# Patient Record
Sex: MALE | Employment: FULL TIME | URBAN - METROPOLITAN AREA
[De-identification: names, ages, dates, MRNs, and addresses within clinical notes are randomized per-mention and may not be internally consistent; named-entity substitution may affect disease eponyms.]

---

## 2021-06-18 ENCOUNTER — TELEPHONE (OUTPATIENT)
Dept: SURGERY | Facility: CLINIC | Age: 51
End: 2021-06-18

## 2021-06-21 ENCOUNTER — OFFICE VISIT (OUTPATIENT)
Dept: SURGERY | Facility: CLINIC | Age: 51
End: 2021-06-21
Payer: COMMERCIAL

## 2021-06-21 VITALS
HEART RATE: 68 BPM | DIASTOLIC BLOOD PRESSURE: 63 MMHG | SYSTOLIC BLOOD PRESSURE: 105 MMHG | WEIGHT: 217 LBS | HEIGHT: 65 IN | BODY MASS INDEX: 36.15 KG/M2

## 2021-06-21 DIAGNOSIS — K64.8 HEMORRHOIDS, INTERNAL, WITH BLEEDING: Primary | ICD-10-CM

## 2021-06-21 PROCEDURE — 99203 OFFICE O/P NEW LOW 30 MIN: CPT | Mod: 25,S$GLB,, | Performed by: COLON & RECTAL SURGERY

## 2021-06-21 PROCEDURE — 3008F PR BODY MASS INDEX (BMI) DOCUMENTED: ICD-10-PCS | Mod: CPTII,S$GLB,, | Performed by: COLON & RECTAL SURGERY

## 2021-06-21 PROCEDURE — 99999 PR PBB SHADOW E&M-EST. PATIENT-LVL III: CPT | Mod: PBBFAC,,, | Performed by: COLON & RECTAL SURGERY

## 2021-06-21 PROCEDURE — 1126F PR PAIN SEVERITY QUANTIFIED, NO PAIN PRESENT: ICD-10-PCS | Mod: CPTII,S$GLB,, | Performed by: COLON & RECTAL SURGERY

## 2021-06-21 PROCEDURE — 99999 PR PBB SHADOW E&M-EST. PATIENT-LVL III: ICD-10-PCS | Mod: PBBFAC,,, | Performed by: COLON & RECTAL SURGERY

## 2021-06-21 PROCEDURE — 46221 PR HEMORRHOIDECTOMY INTERNAL RUBBER BAND LIGATIONS: ICD-10-PCS | Mod: S$GLB,,, | Performed by: COLON & RECTAL SURGERY

## 2021-06-21 PROCEDURE — 46221 LIGATION OF HEMORRHOID(S): CPT | Mod: S$GLB,,, | Performed by: COLON & RECTAL SURGERY

## 2021-06-21 PROCEDURE — 1126F AMNT PAIN NOTED NONE PRSNT: CPT | Mod: CPTII,S$GLB,, | Performed by: COLON & RECTAL SURGERY

## 2021-06-21 PROCEDURE — 3008F BODY MASS INDEX DOCD: CPT | Mod: CPTII,S$GLB,, | Performed by: COLON & RECTAL SURGERY

## 2021-06-21 PROCEDURE — 99203 PR OFFICE/OUTPT VISIT, NEW, LEVL III, 30-44 MIN: ICD-10-PCS | Mod: 25,S$GLB,, | Performed by: COLON & RECTAL SURGERY

## 2021-06-21 RX ORDER — LISINOPRIL 10 MG/1
10 TABLET ORAL EVERY MORNING
COMMUNITY

## 2021-06-21 RX ORDER — FLUOXETINE 20 MG/1
20 TABLET ORAL EVERY MORNING
COMMUNITY

## 2021-06-21 RX ORDER — LOVASTATIN 40 MG/1
40 TABLET ORAL NIGHTLY
COMMUNITY

## 2021-06-21 RX ORDER — INDOMETHACIN 50 MG/1
50 CAPSULE ORAL 3 TIMES DAILY
COMMUNITY
End: 2021-12-02

## 2021-06-26 ENCOUNTER — NURSE TRIAGE (OUTPATIENT)
Dept: ADMINISTRATIVE | Facility: CLINIC | Age: 51
End: 2021-06-26

## 2021-06-29 ENCOUNTER — TELEPHONE (OUTPATIENT)
Dept: SURGERY | Facility: CLINIC | Age: 51
End: 2021-06-29

## 2021-07-22 ENCOUNTER — OFFICE VISIT (OUTPATIENT)
Dept: SURGERY | Facility: CLINIC | Age: 51
End: 2021-07-22
Payer: COMMERCIAL

## 2021-07-22 VITALS
DIASTOLIC BLOOD PRESSURE: 86 MMHG | WEIGHT: 215.19 LBS | HEART RATE: 85 BPM | SYSTOLIC BLOOD PRESSURE: 124 MMHG | BODY MASS INDEX: 35.85 KG/M2 | HEIGHT: 65 IN | OXYGEN SATURATION: 99 %

## 2021-07-22 DIAGNOSIS — K64.8 INTERNAL HEMORRHOIDS: Primary | ICD-10-CM

## 2021-07-22 PROCEDURE — 99999 PR PBB SHADOW E&M-EST. PATIENT-LVL III: ICD-10-PCS | Mod: PBBFAC,,, | Performed by: COLON & RECTAL SURGERY

## 2021-07-22 PROCEDURE — 99999 PR PBB SHADOW E&M-EST. PATIENT-LVL III: CPT | Mod: PBBFAC,,, | Performed by: COLON & RECTAL SURGERY

## 2021-07-22 PROCEDURE — 3008F BODY MASS INDEX DOCD: CPT | Mod: CPTII,S$GLB,, | Performed by: COLON & RECTAL SURGERY

## 2021-07-22 PROCEDURE — 99213 OFFICE O/P EST LOW 20 MIN: CPT | Mod: 25,S$GLB,, | Performed by: COLON & RECTAL SURGERY

## 2021-07-22 PROCEDURE — 46600 PR DIAG2STIC A2SCOPY: ICD-10-PCS | Mod: S$GLB,,, | Performed by: COLON & RECTAL SURGERY

## 2021-07-22 PROCEDURE — 3008F PR BODY MASS INDEX (BMI) DOCUMENTED: ICD-10-PCS | Mod: CPTII,S$GLB,, | Performed by: COLON & RECTAL SURGERY

## 2021-07-22 PROCEDURE — 99213 PR OFFICE/OUTPT VISIT, EST, LEVL III, 20-29 MIN: ICD-10-PCS | Mod: 25,S$GLB,, | Performed by: COLON & RECTAL SURGERY

## 2021-07-22 PROCEDURE — 1126F AMNT PAIN NOTED NONE PRSNT: CPT | Mod: CPTII,S$GLB,, | Performed by: COLON & RECTAL SURGERY

## 2021-07-22 PROCEDURE — 1126F PR PAIN SEVERITY QUANTIFIED, NO PAIN PRESENT: ICD-10-PCS | Mod: CPTII,S$GLB,, | Performed by: COLON & RECTAL SURGERY

## 2021-07-22 PROCEDURE — 46600 DIAGNOSTIC ANOSCOPY SPX: CPT | Mod: S$GLB,,, | Performed by: COLON & RECTAL SURGERY

## 2021-07-22 RX ORDER — PANTOPRAZOLE SODIUM 40 MG/1
40 TABLET, DELAYED RELEASE ORAL DAILY
COMMUNITY
Start: 2021-05-26 | End: 2021-07-22

## 2021-07-22 RX ORDER — MULTIVIT WITH MINERALS/HERBS
1 TABLET ORAL DAILY
COMMUNITY
End: 2021-12-02

## 2021-07-22 RX ORDER — ACETAMINOPHEN 325 MG/1
325 TABLET ORAL DAILY PRN
COMMUNITY
End: 2021-12-02

## 2021-10-11 ENCOUNTER — TELEPHONE (OUTPATIENT)
Dept: SURGERY | Facility: CLINIC | Age: 51
End: 2021-10-11

## 2021-10-14 ENCOUNTER — OFFICE VISIT (OUTPATIENT)
Dept: SURGERY | Facility: CLINIC | Age: 51
End: 2021-10-14
Payer: COMMERCIAL

## 2021-10-14 VITALS
DIASTOLIC BLOOD PRESSURE: 79 MMHG | HEIGHT: 65 IN | BODY MASS INDEX: 34.43 KG/M2 | SYSTOLIC BLOOD PRESSURE: 134 MMHG | WEIGHT: 206.63 LBS | HEART RATE: 75 BPM

## 2021-10-14 DIAGNOSIS — K64.8 PROLAPSED INTERNAL HEMORRHOIDS: Primary | ICD-10-CM

## 2021-10-14 PROCEDURE — 99999 PR PBB SHADOW E&M-EST. PATIENT-LVL III: CPT | Mod: PBBFAC,,, | Performed by: COLON & RECTAL SURGERY

## 2021-10-14 PROCEDURE — 99213 OFFICE O/P EST LOW 20 MIN: CPT | Mod: S$GLB,,, | Performed by: COLON & RECTAL SURGERY

## 2021-10-14 PROCEDURE — 99999 PR PBB SHADOW E&M-EST. PATIENT-LVL III: ICD-10-PCS | Mod: PBBFAC,,, | Performed by: COLON & RECTAL SURGERY

## 2021-10-14 PROCEDURE — 99213 PR OFFICE/OUTPT VISIT, EST, LEVL III, 20-29 MIN: ICD-10-PCS | Mod: S$GLB,,, | Performed by: COLON & RECTAL SURGERY

## 2021-10-19 ENCOUNTER — HOSPITAL ENCOUNTER (OUTPATIENT)
Dept: PULMONOLOGY | Facility: HOSPITAL | Age: 51
Discharge: HOME | End: 2021-10-19
Attending: COLON & RECTAL SURGERY
Payer: COMMERCIAL

## 2021-10-19 ENCOUNTER — HOSPITAL ENCOUNTER (OUTPATIENT)
Dept: PREADMISSION TESTING | Facility: HOSPITAL | Age: 51
Discharge: HOME | End: 2021-10-19
Attending: COLON & RECTAL SURGERY
Payer: COMMERCIAL

## 2021-10-19 DIAGNOSIS — Z01.818 PRE-OP TESTING: ICD-10-CM

## 2021-10-19 DIAGNOSIS — Z01.818 PREOP TESTING: ICD-10-CM

## 2021-10-19 LAB
SARS-COV-2 RNA RESP QL NAA+PROBE: NOT DETECTED
SARS-COV-2- CYCLE NUMBER: NORMAL

## 2021-10-19 PROCEDURE — U0005 INFEC AGEN DETEC AMPLI PROBE: HCPCS | Performed by: COLON & RECTAL SURGERY

## 2021-10-19 PROCEDURE — 93005 ELECTROCARDIOGRAM TRACING: CPT

## 2021-10-19 PROCEDURE — U0003 INFECTIOUS AGENT DETECTION BY NUCLEIC ACID (DNA OR RNA); SEVERE ACUTE RESPIRATORY SYNDROME CORONAVIRUS 2 (SARS-COV-2) (CORONAVIRUS DISEASE [COVID-19]), AMPLIFIED PROBE TECHNIQUE, MAKING USE OF HIGH THROUGHPUT TECHNOLOGIES AS DESCRIBED BY CMS-2020-01-R: HCPCS | Performed by: COLON & RECTAL SURGERY

## 2021-10-19 PROCEDURE — 93010 ELECTROCARDIOGRAM REPORT: CPT | Mod: ,,, | Performed by: INTERNAL MEDICINE

## 2021-10-19 PROCEDURE — 93010 EKG 12-LEAD: ICD-10-PCS | Mod: ,,, | Performed by: INTERNAL MEDICINE

## 2021-10-20 ENCOUNTER — ANESTHESIA EVENT (OUTPATIENT)
Dept: ENDOSCOPY | Facility: HOSPITAL | Age: 51
End: 2021-10-20
Payer: COMMERCIAL

## 2021-10-21 ENCOUNTER — HOSPITAL ENCOUNTER (OUTPATIENT)
Facility: HOSPITAL | Age: 51
Discharge: HOME | End: 2021-10-21
Attending: COLON & RECTAL SURGERY | Admitting: COLON & RECTAL SURGERY
Payer: COMMERCIAL

## 2021-10-21 ENCOUNTER — ANESTHESIA (OUTPATIENT)
Dept: ENDOSCOPY | Facility: HOSPITAL | Age: 51
End: 2021-10-21
Payer: COMMERCIAL

## 2021-10-21 VITALS
OXYGEN SATURATION: 97 % | RESPIRATION RATE: 18 BRPM | DIASTOLIC BLOOD PRESSURE: 60 MMHG | HEART RATE: 75 BPM | SYSTOLIC BLOOD PRESSURE: 104 MMHG

## 2021-10-21 DIAGNOSIS — Z12.11 COLON CANCER SCREENING: ICD-10-CM

## 2021-10-21 PROCEDURE — 37000009 HC ANESTHESIA EA ADD 15 MINS: Performed by: COLON & RECTAL SURGERY

## 2021-10-21 PROCEDURE — 00812 ANES LWR INTST SCR COLSC: CPT | Mod: QZ,P2 | Performed by: NURSE ANESTHETIST, CERTIFIED REGISTERED

## 2021-10-21 PROCEDURE — 63600175 PHARM REV CODE 636 W HCPCS: Performed by: COLON & RECTAL SURGERY

## 2021-10-21 PROCEDURE — 45378 DIAGNOSTIC COLONOSCOPY: CPT | Performed by: COLON & RECTAL SURGERY

## 2021-10-21 PROCEDURE — 37000008 HC ANESTHESIA 1ST 15 MINUTES: Performed by: COLON & RECTAL SURGERY

## 2021-10-21 RX ORDER — PROPOFOL 10 MG/ML
INJECTION, EMULSION INTRAVENOUS
Status: DISCONTINUED
Start: 2021-10-21 | End: 2021-10-21 | Stop reason: HOSPADM

## 2021-10-21 RX ORDER — SODIUM CHLORIDE, SODIUM LACTATE, POTASSIUM CHLORIDE, CALCIUM CHLORIDE 600; 310; 30; 20 MG/100ML; MG/100ML; MG/100ML; MG/100ML
INJECTION, SOLUTION INTRAVENOUS CONTINUOUS
Status: DISCONTINUED | OUTPATIENT
Start: 2021-10-21 | End: 2021-10-21 | Stop reason: HOSPADM

## 2021-10-21 RX ADMIN — SODIUM CHLORIDE, SODIUM LACTATE, POTASSIUM CHLORIDE, AND CALCIUM CHLORIDE: .6; .31; .03; .02 INJECTION, SOLUTION INTRAVENOUS at 10:10

## 2021-10-22 ENCOUNTER — TELEPHONE (OUTPATIENT)
Dept: SURGERY | Facility: CLINIC | Age: 51
End: 2021-10-22

## 2021-10-25 ENCOUNTER — TELEPHONE (OUTPATIENT)
Dept: SURGERY | Facility: CLINIC | Age: 51
End: 2021-10-25
Payer: COMMERCIAL

## 2021-10-27 ENCOUNTER — HOSPITAL ENCOUNTER (OUTPATIENT)
Dept: RADIOLOGY | Facility: HOSPITAL | Age: 51
Discharge: HOME | End: 2021-10-27
Attending: COLON & RECTAL SURGERY
Payer: COMMERCIAL

## 2021-10-27 DIAGNOSIS — K62.3 RECTAL PROLAPSE: ICD-10-CM

## 2021-10-27 PROCEDURE — 25500020 PHARM REV CODE 255: Performed by: COLON & RECTAL SURGERY

## 2021-10-27 PROCEDURE — A9698 NON-RAD CONTRAST MATERIALNOC: HCPCS | Performed by: COLON & RECTAL SURGERY

## 2021-10-27 PROCEDURE — 74270 X-RAY XM COLON 1CNTRST STD: CPT | Mod: TC

## 2021-10-27 PROCEDURE — 74270 X-RAY XM COLON 1CNTRST STD: CPT | Mod: 26,,, | Performed by: RADIOLOGY

## 2021-10-27 PROCEDURE — 74270 FL DEFECOGRAM: ICD-10-PCS | Mod: 26,,, | Performed by: RADIOLOGY

## 2021-10-27 RX ADMIN — BARIUM SULFATE 454 G: 0.6 CREAM ORAL at 10:10

## 2021-10-27 RX ADMIN — BARIUM SULFATE 60 ML: 0.6 SUSPENSION ORAL at 10:10

## 2021-10-29 ENCOUNTER — TELEPHONE (OUTPATIENT)
Dept: SURGERY | Facility: CLINIC | Age: 51
End: 2021-10-29
Payer: COMMERCIAL

## 2021-11-02 ENCOUNTER — TELEPHONE (OUTPATIENT)
Dept: SURGERY | Facility: CLINIC | Age: 51
End: 2021-11-02
Payer: COMMERCIAL

## 2021-11-05 ENCOUNTER — PATIENT MESSAGE (OUTPATIENT)
Dept: SURGERY | Facility: CLINIC | Age: 51
End: 2021-11-05
Payer: COMMERCIAL

## 2021-11-08 ENCOUNTER — PATIENT MESSAGE (OUTPATIENT)
Dept: SURGERY | Facility: CLINIC | Age: 51
End: 2021-11-08
Payer: COMMERCIAL

## 2021-11-08 DIAGNOSIS — K64.8 PROLAPSED INTERNAL HEMORRHOIDS: Primary | ICD-10-CM

## 2021-11-08 DIAGNOSIS — K64.4 EXTERNAL HEMORRHOIDS: ICD-10-CM

## 2021-11-09 ENCOUNTER — TELEPHONE (OUTPATIENT)
Dept: SURGERY | Facility: CLINIC | Age: 51
End: 2021-11-09
Payer: COMMERCIAL

## 2021-12-02 ENCOUNTER — OFFICE VISIT (OUTPATIENT)
Dept: SURGERY | Facility: CLINIC | Age: 51
End: 2021-12-02
Payer: COMMERCIAL

## 2021-12-02 ENCOUNTER — HOSPITAL ENCOUNTER (OUTPATIENT)
Dept: PULMONOLOGY | Facility: HOSPITAL | Age: 51
Discharge: HOME | End: 2021-12-02
Attending: COLON & RECTAL SURGERY
Payer: COMMERCIAL

## 2021-12-02 VITALS
BODY MASS INDEX: 35.85 KG/M2 | DIASTOLIC BLOOD PRESSURE: 74 MMHG | WEIGHT: 215.19 LBS | SYSTOLIC BLOOD PRESSURE: 130 MMHG | HEIGHT: 65 IN | HEART RATE: 90 BPM | RESPIRATION RATE: 16 BRPM

## 2021-12-02 DIAGNOSIS — K64.8 PROLAPSED INTERNAL HEMORRHOIDS: Primary | ICD-10-CM

## 2021-12-02 DIAGNOSIS — K64.4 EXTERNAL HEMORRHOIDS: ICD-10-CM

## 2021-12-02 DIAGNOSIS — K64.8 PROLAPSED INTERNAL HEMORRHOIDS: ICD-10-CM

## 2021-12-02 PROCEDURE — 99213 PR OFFICE/OUTPT VISIT, EST, LEVL III, 20-29 MIN: ICD-10-PCS | Mod: S$GLB,,, | Performed by: COLON & RECTAL SURGERY

## 2021-12-02 PROCEDURE — 99999 PR PBB SHADOW E&M-EST. PATIENT-LVL III: ICD-10-PCS | Mod: PBBFAC,,, | Performed by: COLON & RECTAL SURGERY

## 2021-12-02 PROCEDURE — 93010 ELECTROCARDIOGRAM REPORT: CPT | Mod: ,,, | Performed by: INTERNAL MEDICINE

## 2021-12-02 PROCEDURE — 93010 EKG 12-LEAD: ICD-10-PCS | Mod: ,,, | Performed by: INTERNAL MEDICINE

## 2021-12-02 PROCEDURE — 99999 PR PBB SHADOW E&M-EST. PATIENT-LVL III: CPT | Mod: PBBFAC,,, | Performed by: COLON & RECTAL SURGERY

## 2021-12-02 PROCEDURE — 93005 ELECTROCARDIOGRAM TRACING: CPT

## 2021-12-02 PROCEDURE — 99213 OFFICE O/P EST LOW 20 MIN: CPT | Mod: S$GLB,,, | Performed by: COLON & RECTAL SURGERY

## 2021-12-02 RX ORDER — LEVOFLOXACIN 500 MG/1
500 TABLET, FILM COATED ORAL DAILY
COMMUNITY
Start: 2021-06-06 | End: 2021-12-09 | Stop reason: CLARIF

## 2021-12-09 ENCOUNTER — TELEPHONE (OUTPATIENT)
Dept: SURGERY | Facility: CLINIC | Age: 51
End: 2021-12-09
Payer: COMMERCIAL

## 2021-12-10 ENCOUNTER — HOSPITAL ENCOUNTER (OUTPATIENT)
Facility: HOSPITAL | Age: 51
Discharge: HOME | End: 2021-12-10
Attending: COLON & RECTAL SURGERY | Admitting: COLON & RECTAL SURGERY
Payer: COMMERCIAL

## 2021-12-10 ENCOUNTER — ANESTHESIA (OUTPATIENT)
Dept: SURGERY | Facility: HOSPITAL | Age: 51
End: 2021-12-10
Payer: COMMERCIAL

## 2021-12-10 ENCOUNTER — ANESTHESIA EVENT (OUTPATIENT)
Dept: SURGERY | Facility: HOSPITAL | Age: 51
End: 2021-12-10
Payer: COMMERCIAL

## 2021-12-10 VITALS
DIASTOLIC BLOOD PRESSURE: 55 MMHG | SYSTOLIC BLOOD PRESSURE: 101 MMHG | HEIGHT: 65 IN | HEART RATE: 77 BPM | WEIGHT: 215 LBS | RESPIRATION RATE: 17 BRPM | BODY MASS INDEX: 35.82 KG/M2 | OXYGEN SATURATION: 96 % | TEMPERATURE: 98 F

## 2021-12-10 DIAGNOSIS — K64.9 HEMORRHOIDS, UNSPECIFIED HEMORRHOID TYPE: Primary | ICD-10-CM

## 2021-12-10 DIAGNOSIS — K64.9 HEMORRHOIDS: ICD-10-CM

## 2021-12-10 PROBLEM — K64.8 PROLAPSED INTERNAL HEMORRHOIDS: Status: ACTIVE | Noted: 2021-12-10

## 2021-12-10 PROCEDURE — 46260 REMOVE IN/EX HEM GROUPS 2+: CPT | Mod: ,,, | Performed by: COLON & RECTAL SURGERY

## 2021-12-10 PROCEDURE — 37000008 HC ANESTHESIA 1ST 15 MINUTES: Performed by: COLON & RECTAL SURGERY

## 2021-12-10 PROCEDURE — 88304 TISSUE EXAM BY PATHOLOGIST: CPT | Mod: 26,,, | Performed by: STUDENT IN AN ORGANIZED HEALTH CARE EDUCATION/TRAINING PROGRAM

## 2021-12-10 PROCEDURE — 25000003 PHARM REV CODE 250: Performed by: COLON & RECTAL SURGERY

## 2021-12-10 PROCEDURE — 71000016 HC POSTOP RECOV ADDL HR: Performed by: COLON & RECTAL SURGERY

## 2021-12-10 PROCEDURE — 71000015 HC POSTOP RECOV 1ST HR: Performed by: COLON & RECTAL SURGERY

## 2021-12-10 PROCEDURE — D9220A PRA ANESTHESIA: ICD-10-PCS | Mod: ,,, | Performed by: STUDENT IN AN ORGANIZED HEALTH CARE EDUCATION/TRAINING PROGRAM

## 2021-12-10 PROCEDURE — D9220A PRA ANESTHESIA: ICD-10-PCS | Mod: ,,, | Performed by: REGISTERED NURSE

## 2021-12-10 PROCEDURE — D9220A PRA ANESTHESIA: Mod: ,,, | Performed by: REGISTERED NURSE

## 2021-12-10 PROCEDURE — 25000003 PHARM REV CODE 250

## 2021-12-10 PROCEDURE — D9220A PRA ANESTHESIA: Mod: ,,, | Performed by: STUDENT IN AN ORGANIZED HEALTH CARE EDUCATION/TRAINING PROGRAM

## 2021-12-10 PROCEDURE — 71000044 HC DOSC ROUTINE RECOVERY FIRST HOUR: Performed by: COLON & RECTAL SURGERY

## 2021-12-10 PROCEDURE — 25000003 PHARM REV CODE 250: Performed by: REGISTERED NURSE

## 2021-12-10 PROCEDURE — 63600175 PHARM REV CODE 636 W HCPCS: Performed by: REGISTERED NURSE

## 2021-12-10 PROCEDURE — 88304 PR  SURG PATH,LEVEL III: ICD-10-PCS | Mod: 26,,, | Performed by: STUDENT IN AN ORGANIZED HEALTH CARE EDUCATION/TRAINING PROGRAM

## 2021-12-10 PROCEDURE — 88304 TISSUE EXAM BY PATHOLOGIST: CPT | Mod: 59 | Performed by: STUDENT IN AN ORGANIZED HEALTH CARE EDUCATION/TRAINING PROGRAM

## 2021-12-10 PROCEDURE — 25000003 PHARM REV CODE 250: Performed by: NURSE PRACTITIONER

## 2021-12-10 PROCEDURE — 27201423 OPTIME MED/SURG SUP & DEVICES STERILE SUPPLY: Performed by: COLON & RECTAL SURGERY

## 2021-12-10 PROCEDURE — 46260 PR HEMORRHOIDECTOMY,INT/EXT, 2+ COLUMNS/GROUPS: ICD-10-PCS | Mod: ,,, | Performed by: COLON & RECTAL SURGERY

## 2021-12-10 PROCEDURE — 37000009 HC ANESTHESIA EA ADD 15 MINS: Performed by: COLON & RECTAL SURGERY

## 2021-12-10 PROCEDURE — 36000707: Performed by: COLON & RECTAL SURGERY

## 2021-12-10 PROCEDURE — 36000706: Performed by: COLON & RECTAL SURGERY

## 2021-12-10 RX ORDER — ROCURONIUM BROMIDE 10 MG/ML
INJECTION, SOLUTION INTRAVENOUS
Status: DISCONTINUED | OUTPATIENT
Start: 2021-12-10 | End: 2021-12-10

## 2021-12-10 RX ORDER — OXYCODONE HYDROCHLORIDE 5 MG/1
5 TABLET ORAL EVERY 4 HOURS PRN
Qty: 25 TABLET | Refills: 0 | Status: SHIPPED | OUTPATIENT
Start: 2021-12-10 | End: 2021-12-13 | Stop reason: SDUPTHER

## 2021-12-10 RX ORDER — DEXMEDETOMIDINE HYDROCHLORIDE 100 UG/ML
INJECTION, SOLUTION INTRAVENOUS
Status: DISCONTINUED | OUTPATIENT
Start: 2021-12-10 | End: 2021-12-10

## 2021-12-10 RX ORDER — SODIUM CHLORIDE 0.9 % (FLUSH) 0.9 %
3 SYRINGE (ML) INJECTION EVERY 4 HOURS PRN
Status: DISCONTINUED | OUTPATIENT
Start: 2021-12-10 | End: 2021-12-10 | Stop reason: HOSPADM

## 2021-12-10 RX ORDER — ONDANSETRON 2 MG/ML
INJECTION INTRAMUSCULAR; INTRAVENOUS
Status: DISCONTINUED | OUTPATIENT
Start: 2021-12-10 | End: 2021-12-10

## 2021-12-10 RX ORDER — HALOPERIDOL 5 MG/ML
0.5 INJECTION INTRAMUSCULAR EVERY 10 MIN PRN
Status: DISCONTINUED | OUTPATIENT
Start: 2021-12-10 | End: 2021-12-10 | Stop reason: HOSPADM

## 2021-12-10 RX ORDER — NEOSTIGMINE METHYLSULFATE 0.5 MG/ML
INJECTION, SOLUTION INTRAVENOUS
Status: DISCONTINUED | OUTPATIENT
Start: 2021-12-10 | End: 2021-12-10

## 2021-12-10 RX ORDER — LIDOCAINE HYDROCHLORIDE 20 MG/ML
INJECTION INTRAVENOUS
Status: DISCONTINUED | OUTPATIENT
Start: 2021-12-10 | End: 2021-12-10

## 2021-12-10 RX ORDER — HYDROMORPHONE HYDROCHLORIDE 1 MG/ML
0.2 INJECTION, SOLUTION INTRAMUSCULAR; INTRAVENOUS; SUBCUTANEOUS EVERY 5 MIN PRN
Status: DISCONTINUED | OUTPATIENT
Start: 2021-12-10 | End: 2021-12-10 | Stop reason: HOSPADM

## 2021-12-10 RX ORDER — IBUPROFEN 200 MG
600 TABLET ORAL EVERY 6 HOURS PRN
Status: DISCONTINUED | OUTPATIENT
Start: 2021-12-10 | End: 2021-12-10 | Stop reason: HOSPADM

## 2021-12-10 RX ORDER — FENTANYL CITRATE 50 UG/ML
INJECTION, SOLUTION INTRAMUSCULAR; INTRAVENOUS
Status: DISCONTINUED | OUTPATIENT
Start: 2021-12-10 | End: 2021-12-10

## 2021-12-10 RX ORDER — OXYCODONE HYDROCHLORIDE 5 MG/1
5 TABLET ORAL EVERY 4 HOURS PRN
Status: DISCONTINUED | OUTPATIENT
Start: 2021-12-10 | End: 2021-12-10 | Stop reason: HOSPADM

## 2021-12-10 RX ORDER — OXYCODONE HYDROCHLORIDE 5 MG/1
10 TABLET ORAL EVERY 4 HOURS PRN
Status: DISCONTINUED | OUTPATIENT
Start: 2021-12-10 | End: 2021-12-10 | Stop reason: HOSPADM

## 2021-12-10 RX ORDER — PROPOFOL 10 MG/ML
VIAL (ML) INTRAVENOUS
Status: DISCONTINUED | OUTPATIENT
Start: 2021-12-10 | End: 2021-12-10

## 2021-12-10 RX ORDER — BUPIVACAINE HYDROCHLORIDE AND EPINEPHRINE 2.5; 5 MG/ML; UG/ML
INJECTION, SOLUTION EPIDURAL; INFILTRATION; INTRACAUDAL; PERINEURAL
Status: DISCONTINUED | OUTPATIENT
Start: 2021-12-10 | End: 2021-12-10 | Stop reason: HOSPADM

## 2021-12-10 RX ORDER — MIDAZOLAM HYDROCHLORIDE 1 MG/ML
INJECTION INTRAMUSCULAR; INTRAVENOUS
Status: DISCONTINUED | OUTPATIENT
Start: 2021-12-10 | End: 2021-12-10

## 2021-12-10 RX ORDER — MUPIROCIN 20 MG/G
OINTMENT TOPICAL
Status: ACTIVE | OUTPATIENT
Start: 2021-12-10

## 2021-12-10 RX ORDER — DEXAMETHASONE SODIUM PHOSPHATE 4 MG/ML
INJECTION, SOLUTION INTRA-ARTICULAR; INTRALESIONAL; INTRAMUSCULAR; INTRAVENOUS; SOFT TISSUE
Status: DISCONTINUED | OUTPATIENT
Start: 2021-12-10 | End: 2021-12-10

## 2021-12-10 RX ORDER — SODIUM CHLORIDE 9 MG/ML
INJECTION, SOLUTION INTRAVENOUS CONTINUOUS PRN
Status: DISCONTINUED | OUTPATIENT
Start: 2021-12-10 | End: 2021-12-10

## 2021-12-10 RX ORDER — SODIUM CHLORIDE 9 MG/ML
INJECTION, SOLUTION INTRAVENOUS CONTINUOUS
Status: ACTIVE | OUTPATIENT
Start: 2021-12-10

## 2021-12-10 RX ADMIN — DEXMEDETOMIDINE HYDROCHLORIDE 12 MCG: 100 INJECTION, SOLUTION, CONCENTRATE INTRAVENOUS at 08:12

## 2021-12-10 RX ADMIN — FENTANYL CITRATE 25 MCG: 50 INJECTION, SOLUTION INTRAMUSCULAR; INTRAVENOUS at 08:12

## 2021-12-10 RX ADMIN — FENTANYL CITRATE 50 MCG: 50 INJECTION, SOLUTION INTRAMUSCULAR; INTRAVENOUS at 07:12

## 2021-12-10 RX ADMIN — OXYCODONE 10 MG: 5 TABLET ORAL at 08:12

## 2021-12-10 RX ADMIN — PROPOFOL 200 MG: 10 INJECTION, EMULSION INTRAVENOUS at 07:12

## 2021-12-10 RX ADMIN — SODIUM CHLORIDE: 0.9 INJECTION, SOLUTION INTRAVENOUS at 06:12

## 2021-12-10 RX ADMIN — GLYCOPYRROLATE 0.6 MG: 0.2 INJECTION, SOLUTION INTRAMUSCULAR; INTRAVITREAL at 08:12

## 2021-12-10 RX ADMIN — DEXAMETHASONE SODIUM PHOSPHATE 8 MG: 4 INJECTION, SOLUTION INTRAMUSCULAR; INTRAVENOUS at 07:12

## 2021-12-10 RX ADMIN — MUPIROCIN: 20 OINTMENT TOPICAL at 05:12

## 2021-12-10 RX ADMIN — FENTANYL CITRATE 25 MCG: 50 INJECTION, SOLUTION INTRAMUSCULAR; INTRAVENOUS at 07:12

## 2021-12-10 RX ADMIN — LIDOCAINE HYDROCHLORIDE 100 MG: 20 INJECTION, SOLUTION INTRAVENOUS at 07:12

## 2021-12-10 RX ADMIN — GLYCOPYRROLATE 0.2 MG: 0.2 INJECTION, SOLUTION INTRAMUSCULAR; INTRAVITREAL at 07:12

## 2021-12-10 RX ADMIN — ROCURONIUM BROMIDE 40 MG: 10 INJECTION, SOLUTION INTRAVENOUS at 07:12

## 2021-12-10 RX ADMIN — NEOSTIGMINE METHYLSULFATE 4 MG: 0.5 INJECTION INTRAVENOUS at 08:12

## 2021-12-10 RX ADMIN — ONDANSETRON 4 MG: 2 INJECTION INTRAMUSCULAR; INTRAVENOUS at 07:12

## 2021-12-10 RX ADMIN — MIDAZOLAM HYDROCHLORIDE 2 MG: 1 INJECTION, SOLUTION INTRAMUSCULAR; INTRAVENOUS at 06:12

## 2021-12-11 ENCOUNTER — NURSE TRIAGE (OUTPATIENT)
Dept: ADMINISTRATIVE | Facility: CLINIC | Age: 51
End: 2021-12-11
Payer: COMMERCIAL

## 2021-12-13 RX ORDER — OXYCODONE HYDROCHLORIDE 5 MG/1
5 TABLET ORAL EVERY 4 HOURS PRN
Qty: 25 TABLET | Refills: 0 | Status: SHIPPED | OUTPATIENT
Start: 2021-12-13 | End: 2021-12-14

## 2021-12-14 ENCOUNTER — PATIENT MESSAGE (OUTPATIENT)
Dept: SURGERY | Facility: CLINIC | Age: 51
End: 2021-12-14
Payer: COMMERCIAL

## 2021-12-14 RX ORDER — OXYCODONE HYDROCHLORIDE 5 MG/1
5 TABLET ORAL EVERY 4 HOURS PRN
Qty: 25 TABLET | Refills: 0 | Status: CANCELLED | OUTPATIENT
Start: 2021-12-14

## 2021-12-14 RX ORDER — OXYCODONE HYDROCHLORIDE 5 MG/1
5 TABLET ORAL EVERY 4 HOURS PRN
Qty: 20 TABLET | Refills: 0 | Status: SHIPPED | OUTPATIENT
Start: 2021-12-14

## 2021-12-17 LAB
FINAL PATHOLOGIC DIAGNOSIS: NORMAL
Lab: NORMAL

## 2022-01-06 ENCOUNTER — OFFICE VISIT (OUTPATIENT)
Dept: SURGERY | Facility: CLINIC | Age: 52
End: 2022-01-06
Payer: COMMERCIAL

## 2022-01-06 VITALS
HEIGHT: 65 IN | BODY MASS INDEX: 35.37 KG/M2 | DIASTOLIC BLOOD PRESSURE: 86 MMHG | WEIGHT: 212.31 LBS | SYSTOLIC BLOOD PRESSURE: 138 MMHG

## 2022-01-06 DIAGNOSIS — K64.4 EXTERNAL HEMORRHOIDS: ICD-10-CM

## 2022-01-06 DIAGNOSIS — K64.8 PROLAPSED INTERNAL HEMORRHOIDS: Primary | ICD-10-CM

## 2022-01-06 PROCEDURE — 1160F RVW MEDS BY RX/DR IN RCRD: CPT | Mod: CPTII,S$GLB,, | Performed by: COLON & RECTAL SURGERY

## 2022-01-06 PROCEDURE — 1159F MED LIST DOCD IN RCRD: CPT | Mod: CPTII,S$GLB,, | Performed by: COLON & RECTAL SURGERY

## 2022-01-06 PROCEDURE — 99999 PR PBB SHADOW E&M-EST. PATIENT-LVL III: CPT | Mod: PBBFAC,,, | Performed by: COLON & RECTAL SURGERY

## 2022-01-06 PROCEDURE — 3075F PR MOST RECENT SYSTOLIC BLOOD PRESS GE 130-139MM HG: ICD-10-PCS | Mod: CPTII,S$GLB,, | Performed by: COLON & RECTAL SURGERY

## 2022-01-06 PROCEDURE — 1160F PR REVIEW ALL MEDS BY PRESCRIBER/CLIN PHARMACIST DOCUMENTED: ICD-10-PCS | Mod: CPTII,S$GLB,, | Performed by: COLON & RECTAL SURGERY

## 2022-01-06 PROCEDURE — 3079F DIAST BP 80-89 MM HG: CPT | Mod: CPTII,S$GLB,, | Performed by: COLON & RECTAL SURGERY

## 2022-01-06 PROCEDURE — 3008F BODY MASS INDEX DOCD: CPT | Mod: CPTII,S$GLB,, | Performed by: COLON & RECTAL SURGERY

## 2022-01-06 PROCEDURE — 99024 PR POST-OP FOLLOW-UP VISIT: ICD-10-PCS | Mod: S$GLB,,, | Performed by: COLON & RECTAL SURGERY

## 2022-01-06 PROCEDURE — 99024 POSTOP FOLLOW-UP VISIT: CPT | Mod: S$GLB,,, | Performed by: COLON & RECTAL SURGERY

## 2022-01-06 PROCEDURE — 1159F PR MEDICATION LIST DOCUMENTED IN MEDICAL RECORD: ICD-10-PCS | Mod: CPTII,S$GLB,, | Performed by: COLON & RECTAL SURGERY

## 2022-01-06 PROCEDURE — 99999 PR PBB SHADOW E&M-EST. PATIENT-LVL III: ICD-10-PCS | Mod: PBBFAC,,, | Performed by: COLON & RECTAL SURGERY

## 2022-01-06 PROCEDURE — 3079F PR MOST RECENT DIASTOLIC BLOOD PRESSURE 80-89 MM HG: ICD-10-PCS | Mod: CPTII,S$GLB,, | Performed by: COLON & RECTAL SURGERY

## 2022-01-06 PROCEDURE — 3008F PR BODY MASS INDEX (BMI) DOCUMENTED: ICD-10-PCS | Mod: CPTII,S$GLB,, | Performed by: COLON & RECTAL SURGERY

## 2022-01-06 PROCEDURE — 3075F SYST BP GE 130 - 139MM HG: CPT | Mod: CPTII,S$GLB,, | Performed by: COLON & RECTAL SURGERY

## 2022-01-06 NOTE — LETTER
January 11, 2022      Mayelin Russell MD  [address] [facility] [address]           [place] - Colon/Rectal Surg  [address] [facility] [address]  Phone: [phone]          Patient: Dhruv Campos Jr.   MR Number: [id number]   YOB: 1970   Date of Visit: 1/6/2022       Dear Dr Russell:    Thank you for referring Dhruv Campos to me for evaluation. Attached you will find relevant portions of my assessment and plan of care.    If you have questions, please do not hesitate to call me. I look forward to following Dhruv Campos along with you.    Sincerely,    Dieudonne Romero MD    Enclosure  CC:  No Recipients    If you would like to receive this communication electronically, please contact [email or web address] or [phone] to request more information on [employer] access.    For providers and/or their staff who would like to refer a patient to [facility], please contact us through our one-stop-shop provider referral line, [facility] , at [phone].    If you feel you have received this communication in error or would no longer like to receive these types of communications, please e-mail [email or web address]

## 2022-01-06 NOTE — PROGRESS NOTES
CRS Post-operative visit    Visit Info:     Procedure: Excisional hemorrhoidectomy (internal/external, 2 column)    Date of Procedure: December 10, 2021    Indication: 51-year-old male with increasingly symptomatic prolapsing internal hemorrhoids that did not improve with conservative measures.  He was brought to the Operating Room for excisional hemorrhoidectomy.    Operative Findings: Markedly enlarged hemorrhoidal columns in the right anterolateral and right posterolateral positions, minimal hemorrhoidal disease in the left lateral position    Current Status:  Doing well postop.  He had the expected degree induration of postoperative pain immediately after surgery, but is doing much better now.  He still has some pain with bowel movements and was sitting, but this is much improved from the initial postop phase.  Pain is not significant enough to require narcotics.  No trouble with continence.  No significant rectal bleeding with bowel movements.  He is keeping his bowel movements on the softer side.    Pathology:   HEMORRHOID, RIGHT ANTERIOR, HEMORRHOIDECTOMY:   -Hemorrhoids.   HEMORRHOID, RIGHT POSTERIOR LATERAL, HEMORRHOIDECTOMY:   -Hemorrhoids.     Physical Exam:  General: Patient Refused male in NAD   Neuro: aaox4   Respiratory: resps even unlabored  Extremities: Warm dry and intact  Anorectal:  Healing hemorrhoidectomy incisions in the right anterior and right posterolateral positions.    Assessment:  1. Prolapsed internal hemorrhoids     2. External hemorrhoids       s/p excisional hemorrhoidectomy    Plan:  Continue increased fiber and fluid intake.  Avoid straining/constipation.  Avoid sitting on the toilet for long periods of time.    Last colonoscopy was October 2021 - no polyps were identified, he is not at increased risk for colorectal cancer, repeat in 10 years    RTO prn      Dieudonne Romero MD, FACS, FASCRS  Senior Staff Surgeon  Department of Colon & Rectal Surgery     This note was created using  voice recognition software, and may contain some unrecognized transcriptional errors.

## 2022-06-16 ENCOUNTER — PATIENT OUTREACH (OUTPATIENT)
Dept: ADMINISTRATIVE | Facility: OTHER | Age: 52
End: 2022-06-16
Payer: COMMERCIAL

## 2022-06-16 VITALS — OXYGEN SATURATION: 98 % | HEART RATE: 79 BPM | DIASTOLIC BLOOD PRESSURE: 82 MMHG | SYSTOLIC BLOOD PRESSURE: 132 MMHG

## 2024-06-13 ENCOUNTER — PATIENT OUTREACH (OUTPATIENT)
Dept: ADMINISTRATIVE | Facility: OTHER | Age: 54
End: 2024-06-13
Payer: COMMERCIAL

## 2024-06-13 VITALS — HEART RATE: 92 BPM | OXYGEN SATURATION: 95 % | DIASTOLIC BLOOD PRESSURE: 86 MMHG | SYSTOLIC BLOOD PRESSURE: 126 MMHG

## 2025-05-11 ENCOUNTER — HOSPITAL ENCOUNTER (EMERGENCY)
Facility: HOSPITAL | Age: 55
Discharge: ANOTHER HOSPITAL | End: 2025-05-11
Attending: SURGERY
Payer: COMMERCIAL

## 2025-05-11 VITALS
TEMPERATURE: 99 F | HEART RATE: 112 BPM | DIASTOLIC BLOOD PRESSURE: 62 MMHG | BODY MASS INDEX: 35.08 KG/M2 | WEIGHT: 210.56 LBS | HEIGHT: 65 IN | OXYGEN SATURATION: 94 % | SYSTOLIC BLOOD PRESSURE: 116 MMHG | RESPIRATION RATE: 18 BRPM

## 2025-05-11 DIAGNOSIS — N12 PYELONEPHRITIS: Primary | ICD-10-CM

## 2025-05-11 DIAGNOSIS — R74.8 ELEVATED LIVER ENZYMES: ICD-10-CM

## 2025-05-11 DIAGNOSIS — R52 PAIN: ICD-10-CM

## 2025-05-11 PROBLEM — N39.0 UTI (URINARY TRACT INFECTION): Status: ACTIVE | Noted: 2025-05-11

## 2025-05-11 PROBLEM — R74.01 TRANSAMINITIS: Status: ACTIVE | Noted: 2025-05-11

## 2025-05-11 PROBLEM — R93.429 ABNORMAL FINDING ON DIAGNOSTIC IMAGING OF KIDNEY: Status: ACTIVE | Noted: 2025-05-11

## 2025-05-11 PROBLEM — E78.5 HLD (HYPERLIPIDEMIA): Status: ACTIVE | Noted: 2025-05-11

## 2025-05-11 PROBLEM — I10 ESSENTIAL HYPERTENSION: Status: ACTIVE | Noted: 2025-05-11

## 2025-05-11 PROBLEM — I47.19 ATRIAL TACHYCARDIA, PAROXYSMAL: Status: ACTIVE | Noted: 2025-05-11

## 2025-05-11 LAB
ABSOLUTE EOSINOPHIL (OHS): 0.07 K/UL
ABSOLUTE MONOCYTE (OHS): 1.98 K/UL (ref 0.3–1)
ABSOLUTE NEUTROPHIL COUNT (OHS): 12.14 K/UL (ref 1.8–7.7)
ALBUMIN SERPL BCP-MCNC: 3.3 G/DL (ref 3.5–5.2)
ALP SERPL-CCNC: 216 UNIT/L (ref 40–150)
ALT SERPL W/O P-5'-P-CCNC: 218 UNIT/L (ref 10–44)
ANION GAP (OHS): 13 MMOL/L (ref 8–16)
AST SERPL-CCNC: 105 UNIT/L (ref 11–45)
BACTERIA #/AREA URNS HPF: ABNORMAL /HPF
BASOPHILS # BLD AUTO: 0.03 K/UL
BASOPHILS NFR BLD AUTO: 0.2 %
BILIRUB SERPL-MCNC: 0.6 MG/DL (ref 0.1–1)
BILIRUB UR QL STRIP.AUTO: NEGATIVE
BUN SERPL-MCNC: 13 MG/DL (ref 6–20)
CALCIUM SERPL-MCNC: 9.7 MG/DL (ref 8.7–10.5)
CHLORIDE SERPL-SCNC: 103 MMOL/L (ref 95–110)
CLARITY UR: ABNORMAL
CO2 SERPL-SCNC: 20 MMOL/L (ref 23–29)
COLOR UR AUTO: YELLOW
CREAT SERPL-MCNC: 1.4 MG/DL (ref 0.5–1.4)
ERYTHROCYTE [DISTWIDTH] IN BLOOD BY AUTOMATED COUNT: 12.9 % (ref 11.5–14.5)
GFR SERPLBLD CREATININE-BSD FMLA CKD-EPI: 60 ML/MIN/1.73/M2
GLUCOSE SERPL-MCNC: 111 MG/DL (ref 70–110)
GLUCOSE UR QL STRIP: NEGATIVE
HAV IGM SERPL QL IA: NORMAL
HBV CORE IGM SERPL QL IA: NORMAL
HBV SURFACE AG SERPL QL IA: NORMAL
HCT VFR BLD AUTO: 40.4 % (ref 40–54)
HCV AB SERPL QL IA: NORMAL
HGB BLD-MCNC: 14.1 GM/DL (ref 14–18)
HGB UR QL STRIP: ABNORMAL
HOLD SPECIMEN: NORMAL
HOLD SPECIMEN: NORMAL
HYALINE CASTS #/AREA URNS LPF: 0 /LPF (ref 0–1)
IMM GRANULOCYTES # BLD AUTO: 0.06 K/UL (ref 0–0.04)
IMM GRANULOCYTES NFR BLD AUTO: 0.4 % (ref 0–0.5)
INFLUENZA A MOLECULAR (OHS): NEGATIVE
INFLUENZA B MOLECULAR (OHS): NEGATIVE
KETONES UR QL STRIP: NEGATIVE
LACTATE SERPL-SCNC: 0.8 MMOL/L (ref 0.5–2.2)
LEUKOCYTE ESTERASE UR QL STRIP: ABNORMAL
LIPASE SERPL-CCNC: 34 U/L (ref 4–60)
LYMPHOCYTES # BLD AUTO: 0.75 K/UL (ref 1–4.8)
MCH RBC QN AUTO: 32 PG (ref 27–31)
MCHC RBC AUTO-ENTMCNC: 34.9 G/DL (ref 32–36)
MCV RBC AUTO: 92 FL (ref 82–98)
MICROSCOPIC COMMENT: ABNORMAL
NITRITE UR QL STRIP: NEGATIVE
NUCLEATED RBC (/100WBC) (OHS): 0 /100 WBC
PH UR STRIP: 6 [PH]
PLATELET # BLD AUTO: 205 K/UL (ref 150–450)
PMV BLD AUTO: 10 FL (ref 9.2–12.9)
POTASSIUM SERPL-SCNC: 3.6 MMOL/L (ref 3.5–5.1)
PROT SERPL-MCNC: 7.5 GM/DL (ref 6–8.4)
PROT UR QL STRIP: ABNORMAL
RBC # BLD AUTO: 4.41 M/UL (ref 4.6–6.2)
RBC #/AREA URNS HPF: 15 /HPF (ref 0–4)
RELATIVE EOSINOPHIL (OHS): 0.5 %
RELATIVE LYMPHOCYTE (OHS): 5 % (ref 18–48)
RELATIVE MONOCYTE (OHS): 13.2 % (ref 4–15)
RELATIVE NEUTROPHIL (OHS): 80.7 % (ref 38–73)
SARS-COV-2 RDRP RESP QL NAA+PROBE: NEGATIVE
SODIUM SERPL-SCNC: 136 MMOL/L (ref 136–145)
SP GR UR STRIP: <=1.005
TROPONIN I SERPL DL<=0.01 NG/ML-MCNC: 0.01 NG/ML
UROBILINOGEN UR STRIP-ACNC: NEGATIVE EU/DL
WBC # BLD AUTO: 15.03 K/UL (ref 3.9–12.7)
WBC #/AREA URNS HPF: >100 /HPF (ref 0–5)

## 2025-05-11 PROCEDURE — 96375 TX/PRO/DX INJ NEW DRUG ADDON: CPT

## 2025-05-11 PROCEDURE — 83690 ASSAY OF LIPASE: CPT | Performed by: SURGERY

## 2025-05-11 PROCEDURE — 25500020 PHARM REV CODE 255: Performed by: SURGERY

## 2025-05-11 PROCEDURE — 63600175 PHARM REV CODE 636 W HCPCS: Mod: JZ,TB | Performed by: STUDENT IN AN ORGANIZED HEALTH CARE EDUCATION/TRAINING PROGRAM

## 2025-05-11 PROCEDURE — 63600175 PHARM REV CODE 636 W HCPCS: Performed by: SURGERY

## 2025-05-11 PROCEDURE — 81003 URINALYSIS AUTO W/O SCOPE: CPT | Performed by: SURGERY

## 2025-05-11 PROCEDURE — 87086 URINE CULTURE/COLONY COUNT: CPT | Performed by: SURGERY

## 2025-05-11 PROCEDURE — 25000003 PHARM REV CODE 250: Performed by: SURGERY

## 2025-05-11 PROCEDURE — 80053 COMPREHEN METABOLIC PANEL: CPT | Performed by: SURGERY

## 2025-05-11 PROCEDURE — 96374 THER/PROPH/DIAG INJ IV PUSH: CPT

## 2025-05-11 PROCEDURE — 87502 INFLUENZA DNA AMP PROBE: CPT | Performed by: SURGERY

## 2025-05-11 PROCEDURE — 80074 ACUTE HEPATITIS PANEL: CPT | Performed by: SURGERY

## 2025-05-11 PROCEDURE — 84484 ASSAY OF TROPONIN QUANT: CPT | Performed by: SURGERY

## 2025-05-11 PROCEDURE — 99285 EMERGENCY DEPT VISIT HI MDM: CPT | Mod: 25

## 2025-05-11 PROCEDURE — 83605 ASSAY OF LACTIC ACID: CPT | Performed by: SURGERY

## 2025-05-11 PROCEDURE — U0002 COVID-19 LAB TEST NON-CDC: HCPCS | Performed by: SURGERY

## 2025-05-11 PROCEDURE — 85025 COMPLETE CBC W/AUTO DIFF WBC: CPT | Performed by: SURGERY

## 2025-05-11 RX ORDER — HYDROCODONE BITARTRATE AND ACETAMINOPHEN 7.5; 325 MG/1; MG/1
1 TABLET ORAL EVERY 6 HOURS PRN
Status: ON HOLD | COMMUNITY
Start: 2025-04-04 | End: 2025-05-15 | Stop reason: HOSPADM

## 2025-05-11 RX ORDER — HYDROMORPHONE HYDROCHLORIDE 1 MG/ML
0.5 INJECTION, SOLUTION INTRAMUSCULAR; INTRAVENOUS; SUBCUTANEOUS
Status: COMPLETED | OUTPATIENT
Start: 2025-05-11 | End: 2025-05-11

## 2025-05-11 RX ORDER — LOSARTAN POTASSIUM 100 MG/1
100 TABLET ORAL DAILY
COMMUNITY
Start: 2025-04-25

## 2025-05-11 RX ORDER — OXYCODONE AND ACETAMINOPHEN 5; 325 MG/1; MG/1
1 TABLET ORAL
Refills: 0 | Status: COMPLETED | OUTPATIENT
Start: 2025-05-11 | End: 2025-05-11

## 2025-05-11 RX ORDER — ALLOPURINOL 300 MG/1
300 TABLET ORAL
COMMUNITY
Start: 2025-04-25

## 2025-05-11 RX ORDER — PANTOPRAZOLE SODIUM 40 MG/1
40 TABLET, DELAYED RELEASE ORAL EVERY MORNING
COMMUNITY
Start: 2025-05-04

## 2025-05-11 RX ORDER — METHYLPREDNISOLONE 4 MG/1
TABLET ORAL
Status: ON HOLD | COMMUNITY
Start: 2025-03-26 | End: 2025-05-15 | Stop reason: HOSPADM

## 2025-05-11 RX ORDER — CEFTRIAXONE 2 G/1
2 INJECTION, POWDER, FOR SOLUTION INTRAMUSCULAR; INTRAVENOUS
Status: COMPLETED | OUTPATIENT
Start: 2025-05-11 | End: 2025-05-11

## 2025-05-11 RX ORDER — KETOROLAC TROMETHAMINE 30 MG/ML
15 INJECTION, SOLUTION INTRAMUSCULAR; INTRAVENOUS
Status: COMPLETED | OUTPATIENT
Start: 2025-05-11 | End: 2025-05-11

## 2025-05-11 RX ORDER — ONDANSETRON HYDROCHLORIDE 2 MG/ML
4 INJECTION, SOLUTION INTRAVENOUS
Status: COMPLETED | OUTPATIENT
Start: 2025-05-11 | End: 2025-05-11

## 2025-05-11 RX ORDER — ACETAMINOPHEN 325 MG/1
650 TABLET ORAL
Status: COMPLETED | OUTPATIENT
Start: 2025-05-11 | End: 2025-05-11

## 2025-05-11 RX ORDER — BUTALBITAL, ACETAMINOPHEN AND CAFFEINE 50; 325; 40 MG/1; MG/1; MG/1
1 TABLET ORAL EVERY 4 HOURS PRN
COMMUNITY
Start: 2025-05-04

## 2025-05-11 RX ORDER — SULFAMETHOXAZOLE AND TRIMETHOPRIM 800; 160 MG/1; MG/1
1 TABLET ORAL 2 TIMES DAILY
Status: ON HOLD | COMMUNITY
Start: 2025-05-07 | End: 2025-05-15 | Stop reason: HOSPADM

## 2025-05-11 RX ORDER — GABAPENTIN 300 MG/1
300 CAPSULE ORAL NIGHTLY
Status: ON HOLD | COMMUNITY
Start: 2025-01-08 | End: 2025-05-15 | Stop reason: HOSPADM

## 2025-05-11 RX ORDER — MELOXICAM 15 MG/1
15 TABLET ORAL
Status: ON HOLD | COMMUNITY
Start: 2025-03-07 | End: 2025-05-15 | Stop reason: HOSPADM

## 2025-05-11 RX ADMIN — CEFTRIAXONE SODIUM 2 G: 2 INJECTION, POWDER, FOR SOLUTION INTRAMUSCULAR; INTRAVENOUS at 05:05

## 2025-05-11 RX ADMIN — ONDANSETRON 4 MG: 2 INJECTION INTRAMUSCULAR; INTRAVENOUS at 07:05

## 2025-05-11 RX ADMIN — IOHEXOL 100 ML: 350 INJECTION, SOLUTION INTRAVENOUS at 05:05

## 2025-05-11 RX ADMIN — OXYCODONE HYDROCHLORIDE AND ACETAMINOPHEN 1 TABLET: 5; 325 TABLET ORAL at 03:05

## 2025-05-11 RX ADMIN — ACETAMINOPHEN 650 MG: 325 TABLET ORAL at 07:05

## 2025-05-11 RX ADMIN — KETOROLAC TROMETHAMINE 15 MG: 30 INJECTION, SOLUTION INTRAMUSCULAR at 10:05

## 2025-05-11 RX ADMIN — HYDROMORPHONE HYDROCHLORIDE 0.5 MG: 1 INJECTION, SOLUTION INTRAMUSCULAR; INTRAVENOUS; SUBCUTANEOUS at 07:05

## 2025-05-11 NOTE — ED PROVIDER NOTES
Encounter Date: 5/11/2025       History     Chief Complaint   Patient presents with    Abdominal Pain     Patient to ED complaining of ABD pain, fever, chills, nausea, vomiting. Recently diagnosed with a kidney infection, took 3 days of bactrim then PCP told to stop.     Patient admitted with a 4 day history of generalized abdominal pain who saw his primary doctor and was prescribed Bactrim for UTI and then later called and said discontinue the Bactrim because the liver enzymes were going up his pain is not intense now but it is episodic associated with vomiting and chills he has had a family history of Crohn's disease     The history is provided by the patient.   Abdominal Pain  The current episode started several days ago. The onset of the illness was gradual. The problem has been gradually worsening. The abdominal pain is generalized. The abdominal pain does not radiate. The severity of the abdominal pain is 5/10. The abdominal pain is relieved by vomiting.     Review of patient's allergies indicates:  No Known Allergies  Past Medical History:   Diagnosis Date    Anxiety     Atrial fibrillation     Gout     Hypercholesterolemia     Hypertension     Peptic ulcer disease      Past Surgical History:   Procedure Laterality Date    COLONOSCOPY N/A 10/21/2021    Procedure: COLONOSCOPY;  Surgeon: Dieudonne Romero MD;  Location: [facility];  Service: Colon and Rectal;  Laterality: N/A;    HEMORRHOIDECTOMY INVOLVING TWO OR MORE ANAL COLUMNS N/A 12/10/2021    Procedure: HEMORRHOIDECTOMY INVOLVING TWO OR MORE ANAL COLUMNS;  Surgeon: Dieudonne Romero MD;  Location: [facility] OR [address] [place] [address];  Service: Colon and Rectal;  Laterality: N/A;     Family History   Problem Relation Name Age of Onset    Colon cancer Neg Hx      Crohn's disease Neg Hx      Ulcerative colitis Neg Hx      Rectal cancer Neg Hx       Social History[1]  Review of Systems   Constitutional:  Positive for appetite change.   HENT: Negative.     Eyes: Negative.     Respiratory:  Positive for wheezing.    Gastrointestinal:  Positive for abdominal pain.   Endocrine: Negative.    Genitourinary: Negative.    Musculoskeletal: Negative.    Allergic/Immunologic: Negative.    Neurological: Negative.    Hematological: Negative.    Psychiatric/Behavioral: Negative.         Physical Exam     Initial Vitals [05/11/25 1531]   BP Pulse Resp Temp SpO2   (!) 137/97 100 17 98.8 °F (37.1 °C) 95 %      MAP       --         Physical Exam    Nursing note and vitals reviewed.  Constitutional: He appears well-developed and well-nourished.   HENT:   Head: Normocephalic.   Eyes: Conjunctivae are normal.   Neck:   Normal range of motion.  Cardiovascular:  Normal rate.           Pulmonary/Chest: Breath sounds normal.   Abdominal: Abdomen is soft.   Genitourinary:    Penis normal.     Musculoskeletal:         General: Normal range of motion.      Cervical back: Normal range of motion.     Neurological: He is alert and oriented to person, place, and time. GCS score is 15. GCS eye subscore is 4. GCS verbal subscore is 5. GCS motor subscore is 6.   Skin: Skin is warm.   Psychiatric: He has a normal mood and affect.         ED Course   Procedures  Labs Reviewed   COMPREHENSIVE METABOLIC PANEL - Abnormal       Result Value    Sodium 136      Potassium 3.6      Chloride 103      CO2 20 (*)     Glucose 111 (*)     BUN 13      Creatinine 1.4      Calcium 9.7      Protein Total 7.5      Albumin 3.3 (*)     Bilirubin Total 0.6       (*)      (*)      (*)     Anion Gap 13      eGFR 60 (*)    URINALYSIS, REFLEX TO URINE CULTURE - Abnormal    Color, UA Yellow      Appearance, UA Cloudy (*)     pH, UA 6.0      Spec Grav UA <=1.005 (*)     Protein, UA 1+ (*)     Glucose, UA Negative      Ketones, UA Negative      Bilirubin, UA Negative      Blood, UA 1+ (*)     Nitrites, UA Negative      Urobilinogen, UA Negative      Leukocyte Esterase, UA 2+ (*)    CBC WITH DIFFERENTIAL - Abnormal    WBC 15.03  (*)     RBC 4.41 (*)     HGB 14.1      HCT 40.4      MCV 92      MCH 32.0 (*)     MCHC 34.9      RDW 12.9      Platelet Count 205      MPV 10.0      Nucleated RBC 0      Neut % 80.7 (*)     Lymph % 5.0 (*)     Mono % 13.2      Eos % 0.5      Basophil % 0.2      Imm Grans % 0.4      Neut # 12.14 (*)     Lymph # 0.75 (*)     Mono # 1.98 (*)     Eos # 0.07      Baso # 0.03      Imm Grans # 0.06 (*)    URINALYSIS MICROSCOPIC - Abnormal    RBC, UA 15 (*)     WBC, UA >100 (*)     Bacteria, UA Many (*)     Hyaline Casts, UA 0      Microscopic Comment       INFLUENZA A & B BY MOLECULAR - Normal    INFLUENZA A MOLECULAR Negative      INFLUENZA B MOLECULAR  Negative     LIPASE - Normal    Lipase Level 34     LACTIC ACID, PLASMA - Normal    Lactic Acid Level 0.8      Narrative:     Falsely low lactic acid results can be found in samples containing >=13.0 mg/dL total bilirubin and/or >=3.5 mg/dL direct bilirubin.    TROPONIN I - Normal    Troponin-I 0.008     SARS-COV-2 RNA AMPLIFICATION, QUAL - Normal    SARS COV-2 Molecular Negative     CULTURE, URINE   CBC W/ AUTO DIFFERENTIAL    Narrative:     The following orders were created for panel order CBC W/ AUTO DIFFERENTIAL.  Procedure                               Abnormality         Status                     ---------                               -----------         ------                     CBC with Differential[[id number]]       Abnormal            Final result                 Please view results for these tests on the individual orders.   HEPATITIS PANEL, ACUTE   EXTRA TUBES    Narrative:     The following orders were created for panel order EXTRA TUBES.  Procedure                               Abnormality         Status                     ---------                               -----------         ------                     Light Blue Top Hold[[id number]]                             In process                   Please view results for these tests on the individual  orders.   LIGHT BLUE TOP HOLD   GREY TOP URINE HOLD          Imaging Results              X-Ray Chest PA And Lateral (In process)                       CT Abdomen Pelvis With IV Contrast NO Oral Contrast (Final result)  Result time 05/11/25 17:16:46      Final result by Thao Ness MD (05/11/25 17:16:46)                   Impression:      Markedly abnormal appearance of the kidneys with innumerable low-density lesions many of which suggest simple cysts and others indeterminate and could be complex cyst or solid masses or even with history of infection, phlegmon or lobar nephronia or abscess difficult to exclude but without definite walled off abscess identified.  There is heterogeneous enhancement particularly of the right kidney suggesting pyelonephritis.    Recommend correlation clinically and recommend further evaluation which could include dedicated CT or MRI renal mass protocol    This report was flagged in Epic as abnormal.      Electronically signed by: Thao Ness MD  Date:    05/11/2025  Time:    17:16               Narrative:    EXAMINATION:  CT ABDOMEN PELVIS WITH IV CONTRAST    CLINICAL HISTORY:  Abdominal pain, acute, nonlocalized;    TECHNIQUE:  Low dose axial images, sagittal and coronal reformations were obtained from the lung bases to the pubic symphysis following the IV administration of 100 mL of Omnipaque 350 no p.o. contrast    COMPARISON:  None.    FINDINGS:  Liver, spleen, adrenal glands, pancreas unremarkable appearance.  Abdominal aorta tapers without aneurysmal dilatation.  No calcified stones the gallbladder or CT findings of acute cholecystitis and no biliary duct dilatation.  Appendix.  No appreciable bowel wall thickening or inflammatory change.    Markedly abnormal appearance of the kidneys.  Innumerable bilateral low-density lesions replacing most of the kidneys.  Bilateral perinephric stranding.  Some the low-density lesions have appearances suggesting simple cysts and  others are indeterminate.  Heterogeneous enhancement of the kidneys particularly the right kidney.    Urinary bladder mildly distended at time of the exam with the wall appearing mildly diffusely thick although accentuated by the incomplete degree of distention recommend correlation clinically if clinical consideration for cystitis or chronic bladder outlet obstruction    Osseous structures show degenerative changes without obvious aggressive appearing osseous lesion                                       Medications   cefTRIAXone injection 2 g (has no administration in time range)   oxyCODONE-acetaminophen 5-325 mg per tablet 1 tablet (1 tablet Oral Given 5/11/25 1009)   iohexoL (OMNIPAQUE 350) injection 100 mL (100 mLs Intravenous Given 5/11/25 0756)     Medical Decision Making  Patient with history of recent UTI has had general malaise for several days and generalized abdominal pain and he has blood in his urine.  He has been on antibiotics for several days but had discontinued because the liver enzymes being elevated CT scan shows a acute infectious processes right kidney with the abnormal appearance of both kidneys he will need a urology consult and we will arrange transfer    Amount and/or Complexity of Data Reviewed  Labs: ordered.  Radiology: ordered.    Risk  Prescription drug management.                                      Clinical Impression:  Final diagnoses:  [R52] Pain                     [1]   Social History  Tobacco Use    Smoking status: Former    Smokeless tobacco: Never   Substance Use Topics    Alcohol use: Never    Drug use: Never        SUKHI Forde III, MD  05/17/25 9659

## 2025-05-11 NOTE — PROVIDER TRANSFER
(Physician in Lead of Transfers)  Outside Transfer Acceptance Note / Regional Referral Center      Upon patient arrival, please contact Hospital Medicine on call.    Referring facility: [facility]   Referring provider: SUKHI JEFFRIES III  Accepting facility: [facility] ([facility])  Accepting provider: DON WAYNE  Admitting provider: VAL CHOWDHURY  Reason for transfer:  Urology evaluation  Transfer diagnosis: possible pyelonephritis, cystic kidneys  Transfer specialty requested: Urology  Transfer specialty notified: Yes  Transfer level: NUMBER 1-5: 2  Bed type requested: med-tele  Isolation status: No active isolations   Admission class or status: IP- Inpatient      Narrative     54-year-old male with a history of paroxysmal atrial fibrillation (not on anticoagulation by report), hyperlipidemia, hypertension, gout, peptic ulcer disease presented to [facility] on May 11 with abdominal pain.  He had seen his primary care provider as an outpatient and was prescribed Bactrim for a UTI.  He was later contacted by his provider who told him to stop the Bactrim because his liver enzymes were increasing.  He is having intermittent abdominal pain associated with vomiting and chills.  He was afebrile with stable blood pressure in the emergency department.  He had leukocytosis, and creatinine was fairly stable compared with labs from 2021.  Urinalysis had protein, blood, leukocytes, and bacteria.  Liver enzymes were elevated, though total bilirubin was normal.  CT abdomen and pelvis noted the liver, spleen, adrenal glands, and pancreas were unremarkable.  The kidneys were markedly abnormal with innumerable bilateral low-density lesions replacing most of the kidneys.  There was bilateral perinephric stranding.  Some of the lesions have appearance suggesting simple cysts and others are indeterminate.  Urinary bladder was mildly distended with the wall appearing mildly diffusely  thick although accentuated by incomplete distention.      Current presentation was concerning for pyelonephritis.  Abnormalities in the kidneys on CT are of uncertain etiology.  Case discussed with Urology, and they are available to see the patient in consultation.  Requesting transfer to Hospital Medicine at [facility] for further treatment of potential pyelonephritis and for further evaluation by Urology.  He will also need monitoring of his liver enzymes.    White blood cells 15.03, hemoglobin 14.1, hematocrit 40.4, platelets 205, lactic acid 0.8, COVID negative, influenza negative, troponin 0.008, lipase 34, sodium 136, potassium 3.6, chloride 103, CO2 20, BUN 13, creatinine 1.4, glucose 111, albumin 3.3, total bilirubin 0.6, ,   -urinalysis with 1+ protein, 1+ blood, 2+ leukocytes, 15 RBC, greater than 100 WBC, many bacteria  -acute hepatitis panel pending, urine culture pending    Chest x-ray had no acute findings    CT abdomen and pelvis with IV contrast noted markedly abnormal appearance of the kidneys with innumerable low-density lesions many of which suggest simple cysts and others indeterminate and could be complex cyst or solid masses, phlegmon, lobar nephronia, or abscess.  No definite walled-off abscess identified.  Heterogeneous enhancement particularly of the right kidney suggesting pyelonephritis.    Objective     Vitals: Temp: 98.4 °F (36.9 °C) (05/11/25 1645)  Pulse: 86 (05/11/25 1645)  Resp: 18 (05/11/25 1645)  BP: 130/80 (05/11/25 1645)  SpO2: 95 % (05/11/25 1645)  Recent Labs: CBC:   Recent Labs   Lab 05/11/25  1603   WBC 15.03*   HGB 14.1   HCT 40.4        CMP:   Recent Labs   Lab 05/11/25  1603      K 3.6      CO2 20*   *   BUN 13   CREATININE 1.4   CALCIUM 9.7   PROT 7.5   ALBUMIN 3.3*   BILITOT 0.6   ALKPHOS 216*   *   *   ANIONGAP 13     Lactic Acid:   Recent Labs   Lab 05/11/25  1603   LACTATE 0.8         Instructions    Admit to  Hospital Medicine  Consult Urology      NEHA Srivastava MD  Hospital Medicine Staff  Cell: [phone]

## 2025-05-12 ENCOUNTER — HOSPITAL ENCOUNTER (INPATIENT)
Facility: OTHER | Age: 55
LOS: 3 days | Discharge: HOME | DRG: 690 | End: 2025-05-15
Attending: HOSPITALIST | Admitting: HOSPITALIST
Payer: COMMERCIAL

## 2025-05-12 DIAGNOSIS — Z91.89 AT RISK FOR LONG QT SYNDROME: ICD-10-CM

## 2025-05-12 DIAGNOSIS — N12 PYELONEPHRITIS: Primary | ICD-10-CM

## 2025-05-12 PROBLEM — R74.8 ELEVATED LIVER ENZYMES: Status: ACTIVE | Noted: 2025-05-12

## 2025-05-12 LAB
ABSOLUTE EOSINOPHIL (OHS): 0.02 K/UL
ABSOLUTE EOSINOPHIL (OHS): 0.02 K/UL
ABSOLUTE MONOCYTE (OHS): 1.64 K/UL (ref 0.3–1)
ABSOLUTE MONOCYTE (OHS): 1.79 K/UL (ref 0.3–1)
ABSOLUTE NEUTROPHIL COUNT (OHS): 11.72 K/UL (ref 1.8–7.7)
ABSOLUTE NEUTROPHIL COUNT (OHS): 13.93 K/UL (ref 1.8–7.7)
ALBUMIN SERPL BCP-MCNC: 3.1 G/DL (ref 3.5–5.2)
ALBUMIN SERPL BCP-MCNC: 3.2 G/DL (ref 3.5–5.2)
ALP SERPL-CCNC: 177 UNIT/L (ref 40–150)
ALP SERPL-CCNC: 192 UNIT/L (ref 40–150)
ALT SERPL W/O P-5'-P-CCNC: 159 UNIT/L (ref 10–44)
ALT SERPL W/O P-5'-P-CCNC: 188 UNIT/L (ref 10–44)
ANION GAP (OHS): 14 MMOL/L (ref 8–16)
ANION GAP (OHS): 14 MMOL/L (ref 8–16)
AST SERPL-CCNC: 45 UNIT/L (ref 11–45)
AST SERPL-CCNC: 64 UNIT/L (ref 11–45)
BASOPHILS # BLD AUTO: 0.03 K/UL
BASOPHILS # BLD AUTO: 0.04 K/UL
BASOPHILS NFR BLD AUTO: 0.2 %
BASOPHILS NFR BLD AUTO: 0.2 %
BILIRUB SERPL-MCNC: 0.7 MG/DL (ref 0.1–1)
BILIRUB SERPL-MCNC: 0.8 MG/DL (ref 0.1–1)
BUN SERPL-MCNC: 14 MG/DL (ref 6–20)
BUN SERPL-MCNC: 15 MG/DL (ref 6–20)
CALCIUM SERPL-MCNC: 9.8 MG/DL (ref 8.7–10.5)
CALCIUM SERPL-MCNC: 9.9 MG/DL (ref 8.7–10.5)
CHLORIDE SERPL-SCNC: 105 MMOL/L (ref 95–110)
CHLORIDE SERPL-SCNC: 106 MMOL/L (ref 95–110)
CO2 SERPL-SCNC: 18 MMOL/L (ref 23–29)
CO2 SERPL-SCNC: 20 MMOL/L (ref 23–29)
CREAT SERPL-MCNC: 1.5 MG/DL (ref 0.5–1.4)
CREAT SERPL-MCNC: 1.5 MG/DL (ref 0.5–1.4)
ERYTHROCYTE [DISTWIDTH] IN BLOOD BY AUTOMATED COUNT: 13.1 % (ref 11.5–14.5)
ERYTHROCYTE [DISTWIDTH] IN BLOOD BY AUTOMATED COUNT: 13.2 % (ref 11.5–14.5)
GFR SERPLBLD CREATININE-BSD FMLA CKD-EPI: 55 ML/MIN/1.73/M2
GFR SERPLBLD CREATININE-BSD FMLA CKD-EPI: 55 ML/MIN/1.73/M2
GLUCOSE SERPL-MCNC: 110 MG/DL (ref 70–110)
GLUCOSE SERPL-MCNC: 119 MG/DL (ref 70–110)
HCT VFR BLD AUTO: 41.1 % (ref 40–54)
HCT VFR BLD AUTO: 42.9 % (ref 40–54)
HGB BLD-MCNC: 13.9 GM/DL (ref 14–18)
HGB BLD-MCNC: 14.7 GM/DL (ref 14–18)
IMM GRANULOCYTES # BLD AUTO: 0.07 K/UL (ref 0–0.04)
IMM GRANULOCYTES # BLD AUTO: 0.11 K/UL (ref 0–0.04)
IMM GRANULOCYTES NFR BLD AUTO: 0.5 % (ref 0–0.5)
IMM GRANULOCYTES NFR BLD AUTO: 0.7 % (ref 0–0.5)
LACTATE SERPL-SCNC: 1 MMOL/L (ref 0.5–2.2)
LACTATE SERPL-SCNC: 1.4 MMOL/L (ref 0.5–2.2)
LACTATE SERPL-SCNC: 1.8 MMOL/L (ref 0.5–2.2)
LYMPHOCYTES # BLD AUTO: 0.54 K/UL (ref 1–4.8)
LYMPHOCYTES # BLD AUTO: 0.57 K/UL (ref 1–4.8)
MAGNESIUM SERPL-MCNC: 1.8 MG/DL (ref 1.6–2.6)
MAGNESIUM SERPL-MCNC: 1.8 MG/DL (ref 1.6–2.6)
MCH RBC QN AUTO: 31.4 PG (ref 27–31)
MCH RBC QN AUTO: 32 PG (ref 27–31)
MCHC RBC AUTO-ENTMCNC: 33.8 G/DL (ref 32–36)
MCHC RBC AUTO-ENTMCNC: 34.3 G/DL (ref 32–36)
MCV RBC AUTO: 93 FL (ref 82–98)
MCV RBC AUTO: 93 FL (ref 82–98)
NUCLEATED RBC (/100WBC) (OHS): 0 /100 WBC
NUCLEATED RBC (/100WBC) (OHS): 0 /100 WBC
PLATELET # BLD AUTO: 205 K/UL (ref 150–450)
PLATELET # BLD AUTO: 216 K/UL (ref 150–450)
PMV BLD AUTO: 10.1 FL (ref 9.2–12.9)
PMV BLD AUTO: 10.2 FL (ref 9.2–12.9)
POTASSIUM SERPL-SCNC: 4.1 MMOL/L (ref 3.5–5.1)
POTASSIUM SERPL-SCNC: 4.1 MMOL/L (ref 3.5–5.1)
PROT SERPL-MCNC: 7.7 GM/DL (ref 6–8.4)
PROT SERPL-MCNC: 7.7 GM/DL (ref 6–8.4)
RBC # BLD AUTO: 4.42 M/UL (ref 4.6–6.2)
RBC # BLD AUTO: 4.6 M/UL (ref 4.6–6.2)
RELATIVE EOSINOPHIL (OHS): 0.1 %
RELATIVE EOSINOPHIL (OHS): 0.1 %
RELATIVE LYMPHOCYTE (OHS): 3.3 % (ref 18–48)
RELATIVE LYMPHOCYTE (OHS): 4.1 % (ref 18–48)
RELATIVE MONOCYTE (OHS): 10.9 % (ref 4–15)
RELATIVE MONOCYTE (OHS): 11.7 % (ref 4–15)
RELATIVE NEUTROPHIL (OHS): 83.4 % (ref 38–73)
RELATIVE NEUTROPHIL (OHS): 84.8 % (ref 38–73)
SODIUM SERPL-SCNC: 138 MMOL/L (ref 136–145)
SODIUM SERPL-SCNC: 139 MMOL/L (ref 136–145)
WBC # BLD AUTO: 14.05 K/UL (ref 3.9–12.7)
WBC # BLD AUTO: 16.43 K/UL (ref 3.9–12.7)

## 2025-05-12 PROCEDURE — 36415 COLL VENOUS BLD VENIPUNCTURE: CPT | Performed by: PHYSICIAN ASSISTANT

## 2025-05-12 PROCEDURE — 83605 ASSAY OF LACTIC ACID: CPT | Performed by: PHYSICIAN ASSISTANT

## 2025-05-12 PROCEDURE — 36415 COLL VENOUS BLD VENIPUNCTURE: CPT | Performed by: HOSPITALIST

## 2025-05-12 PROCEDURE — A4216 STERILE WATER/SALINE, 10 ML: HCPCS | Performed by: PHYSICIAN ASSISTANT

## 2025-05-12 PROCEDURE — 83605 ASSAY OF LACTIC ACID: CPT | Performed by: HOSPITALIST

## 2025-05-12 PROCEDURE — 25000003 PHARM REV CODE 250: Performed by: HOSPITALIST

## 2025-05-12 PROCEDURE — 83735 ASSAY OF MAGNESIUM: CPT | Performed by: PHYSICIAN ASSISTANT

## 2025-05-12 PROCEDURE — 63600175 PHARM REV CODE 636 W HCPCS: Performed by: HOSPITALIST

## 2025-05-12 PROCEDURE — 99223 1ST HOSP IP/OBS HIGH 75: CPT | Mod: ,,, | Performed by: UROLOGY

## 2025-05-12 PROCEDURE — 80053 COMPREHEN METABOLIC PANEL: CPT | Performed by: PHYSICIAN ASSISTANT

## 2025-05-12 PROCEDURE — 85025 COMPLETE CBC W/AUTO DIFF WBC: CPT | Performed by: PHYSICIAN ASSISTANT

## 2025-05-12 PROCEDURE — 51798 US URINE CAPACITY MEASURE: CPT

## 2025-05-12 PROCEDURE — 25000003 PHARM REV CODE 250: Performed by: PHYSICIAN ASSISTANT

## 2025-05-12 PROCEDURE — 21400001 HC TELEMETRY ROOM

## 2025-05-12 RX ORDER — NALOXONE HCL 0.4 MG/ML
0.02 VIAL (ML) INJECTION
Status: DISCONTINUED | OUTPATIENT
Start: 2025-05-12 | End: 2025-05-15 | Stop reason: HOSPADM

## 2025-05-12 RX ORDER — CEFTRIAXONE 1 G/1
1 INJECTION, POWDER, FOR SOLUTION INTRAMUSCULAR; INTRAVENOUS
Status: DISCONTINUED | OUTPATIENT
Start: 2025-05-12 | End: 2025-05-12

## 2025-05-12 RX ORDER — IBUPROFEN 200 MG
16 TABLET ORAL
Status: DISCONTINUED | OUTPATIENT
Start: 2025-05-12 | End: 2025-05-15 | Stop reason: HOSPADM

## 2025-05-12 RX ORDER — SODIUM CHLORIDE 0.9 % (FLUSH) 0.9 %
10 SYRINGE (ML) INJECTION EVERY 8 HOURS
Status: DISCONTINUED | OUTPATIENT
Start: 2025-05-12 | End: 2025-05-15 | Stop reason: HOSPADM

## 2025-05-12 RX ORDER — ALLOPURINOL 300 MG/1
300 TABLET ORAL DAILY
Status: DISCONTINUED | OUTPATIENT
Start: 2025-05-12 | End: 2025-05-15 | Stop reason: HOSPADM

## 2025-05-12 RX ORDER — KETOROLAC TROMETHAMINE 10 MG/1
10 TABLET, FILM COATED ORAL EVERY 6 HOURS PRN
Status: DISCONTINUED | OUTPATIENT
Start: 2025-05-12 | End: 2025-05-14

## 2025-05-12 RX ORDER — SODIUM CHLORIDE 9 MG/ML
INJECTION, SOLUTION INTRAVENOUS CONTINUOUS
Status: DISCONTINUED | OUTPATIENT
Start: 2025-05-12 | End: 2025-05-15

## 2025-05-12 RX ORDER — ATORVASTATIN CALCIUM 10 MG/1
10 TABLET, FILM COATED ORAL DAILY
Status: DISCONTINUED | OUTPATIENT
Start: 2025-05-12 | End: 2025-05-12

## 2025-05-12 RX ORDER — IBUPROFEN 200 MG
24 TABLET ORAL
Status: DISCONTINUED | OUTPATIENT
Start: 2025-05-12 | End: 2025-05-15 | Stop reason: HOSPADM

## 2025-05-12 RX ORDER — OXYCODONE AND ACETAMINOPHEN 5; 325 MG/1; MG/1
1 TABLET ORAL EVERY 4 HOURS PRN
Refills: 0 | Status: DISCONTINUED | OUTPATIENT
Start: 2025-05-12 | End: 2025-05-14

## 2025-05-12 RX ORDER — FLUOXETINE 20 MG/1
20 CAPSULE ORAL DAILY
Status: DISCONTINUED | OUTPATIENT
Start: 2025-05-12 | End: 2025-05-15 | Stop reason: HOSPADM

## 2025-05-12 RX ORDER — GLUCAGON 1 MG
1 KIT INJECTION
Status: DISCONTINUED | OUTPATIENT
Start: 2025-05-12 | End: 2025-05-15 | Stop reason: HOSPADM

## 2025-05-12 RX ORDER — TALC
6 POWDER (GRAM) TOPICAL NIGHTLY PRN
Status: DISCONTINUED | OUTPATIENT
Start: 2025-05-12 | End: 2025-05-15 | Stop reason: HOSPADM

## 2025-05-12 RX ORDER — ONDANSETRON HYDROCHLORIDE 2 MG/ML
4 INJECTION, SOLUTION INTRAVENOUS EVERY 6 HOURS PRN
Status: DISCONTINUED | OUTPATIENT
Start: 2025-05-12 | End: 2025-05-15 | Stop reason: HOSPADM

## 2025-05-12 RX ORDER — ACETAMINOPHEN 325 MG/1
650 TABLET ORAL EVERY 6 HOURS PRN
Status: DISCONTINUED | OUTPATIENT
Start: 2025-05-12 | End: 2025-05-14

## 2025-05-12 RX ORDER — LOSARTAN POTASSIUM 50 MG/1
100 TABLET ORAL DAILY
Status: DISCONTINUED | OUTPATIENT
Start: 2025-05-12 | End: 2025-05-15 | Stop reason: HOSPADM

## 2025-05-12 RX ORDER — AMOXICILLIN 250 MG
1 CAPSULE ORAL 2 TIMES DAILY PRN
Status: DISCONTINUED | OUTPATIENT
Start: 2025-05-12 | End: 2025-05-15 | Stop reason: HOSPADM

## 2025-05-12 RX ORDER — PANTOPRAZOLE SODIUM 40 MG/1
40 TABLET, DELAYED RELEASE ORAL EVERY MORNING
Status: DISCONTINUED | OUTPATIENT
Start: 2025-05-13 | End: 2025-05-15 | Stop reason: HOSPADM

## 2025-05-12 RX ADMIN — SODIUM CHLORIDE: 9 INJECTION, SOLUTION INTRAVENOUS at 12:05

## 2025-05-12 RX ADMIN — ALLOPURINOL 300 MG: 300 TABLET ORAL at 09:05

## 2025-05-12 RX ADMIN — KETOROLAC TROMETHAMINE 10 MG: 10 TABLET, FILM COATED ORAL at 04:05

## 2025-05-12 RX ADMIN — OXYCODONE HYDROCHLORIDE AND ACETAMINOPHEN 1 TABLET: 5; 325 TABLET ORAL at 04:05

## 2025-05-12 RX ADMIN — Medication 10 ML: at 05:05

## 2025-05-12 RX ADMIN — PIPERACILLIN AND TAZOBACTAM 4.5 G: 4; .5 INJECTION, POWDER, LYOPHILIZED, FOR SOLUTION INTRAVENOUS at 05:05

## 2025-05-12 RX ADMIN — OXYCODONE HYDROCHLORIDE AND ACETAMINOPHEN 1 TABLET: 5; 325 TABLET ORAL at 10:05

## 2025-05-12 RX ADMIN — SODIUM CHLORIDE: 9 INJECTION, SOLUTION INTRAVENOUS at 10:05

## 2025-05-12 RX ADMIN — ACETAMINOPHEN 650 MG: 325 TABLET, FILM COATED ORAL at 10:05

## 2025-05-12 RX ADMIN — Medication 10 ML: at 10:05

## 2025-05-12 RX ADMIN — FLUOXETINE HYDROCHLORIDE 20 MG: 20 CAPSULE ORAL at 09:05

## 2025-05-12 RX ADMIN — LOSARTAN POTASSIUM 100 MG: 50 TABLET, FILM COATED ORAL at 09:05

## 2025-05-12 RX ADMIN — ACETAMINOPHEN 650 MG: 325 TABLET, FILM COATED ORAL at 04:05

## 2025-05-12 RX ADMIN — Medication 6 MG: at 10:05

## 2025-05-12 RX ADMIN — KETOROLAC TROMETHAMINE 10 MG: 10 TABLET, FILM COATED ORAL at 09:05

## 2025-05-12 RX ADMIN — Medication 10 ML: at 02:05

## 2025-05-12 RX ADMIN — ACETAMINOPHEN 650 MG: 325 TABLET, FILM COATED ORAL at 02:05

## 2025-05-12 RX ADMIN — SODIUM CHLORIDE: 9 INJECTION, SOLUTION INTRAVENOUS at 04:05

## 2025-05-12 NOTE — SUBJECTIVE & OBJECTIVE
Past Medical History:   Diagnosis Date    Anxiety     Atrial fibrillation     Gout     Hypercholesterolemia     Hypertension     Peptic ulcer disease        Past Surgical History:   Procedure Laterality Date    COLONOSCOPY N/A 10/21/2021    Procedure: COLONOSCOPY;  Surgeon: Dieudonne Romero MD;  Location: [facility];  Service: Colon and Rectal;  Laterality: N/A;    HEMORRHOIDECTOMY INVOLVING TWO OR MORE ANAL COLUMNS N/A 12/10/2021    Procedure: HEMORRHOIDECTOMY INVOLVING TWO OR MORE ANAL COLUMNS;  Surgeon: Dieudonne Romero MD;  Location: [facility] OR [address] [place] [address];  Service: Colon and Rectal;  Laterality: N/A;       Review of patient's allergies indicates:  No Known Allergies    Family History    None         Tobacco Use    Smoking status: Former    Smokeless tobacco: Never   Substance and Sexual Activity    Alcohol use: Never    Drug use: Never    Sexual activity: Not on file       Review of Systems   Constitutional:  Positive for chills and fever.   Genitourinary:  Negative for difficulty urinating.       Objective:     Temp:  [98.4 °F (36.9 °C)-102.9 °F (39.4 °C)] 99.3 °F (37.4 °C)  Pulse:  [] 120  Resp:  [17-18] 18  SpO2:  [90 %-96 %] 94 %  BP: (116-144)/(62-97) 141/76  Weight: 94.2 kg (207 lb 10.8 oz)  Body mass index is 34.56 kg/m².           Drains       None                    Physical Exam  Constitutional:       General: He is not in acute distress.     Appearance: Normal appearance.   HENT:      Head: Normocephalic and atraumatic.   Eyes:      Extraocular Movements: Extraocular movements intact.      Pupils: Pupils are equal, round, and reactive to light.   Cardiovascular:      Rate and Rhythm: Normal rate.   Pulmonary:      Effort: Pulmonary effort is normal. No respiratory distress.   Abdominal:      General: Abdomen is flat. There is no distension.      Tenderness: There is no abdominal tenderness. There is no right CVA tenderness or left CVA tenderness.   Skin:     Coloration: Skin is not jaundiced.  "  Neurological:      General: No focal deficit present.      Mental Status: He is alert and oriented to person, place, and time.   Psychiatric:         Mood and Affect: Mood normal.         Behavior: Behavior normal.          Significant Labs:    BMP:  Recent Labs   Lab 05/11/25  1603 05/12/25  0106 05/12/25  0745    138 139   K 3.6 4.1 4.1    106 105   CO2 20* 18* 20*   BUN 13 14 15   CREATININE 1.4 1.5* 1.5*   CALCIUM 9.7 9.8 9.9       CBC:  Recent Labs   Lab 05/11/25 1603 05/12/25  0106 05/12/25  0746   WBC 15.03* 16.43* 14.05*   HGB 14.1 14.7 13.9*   HCT 40.4 42.9 41.1    205 216       Blood Culture: No results for input(s): "LABBLOO" in the last 168 hours.  CMP:   Recent Labs   Lab 05/11/25 1603 05/12/25  0106 05/12/25  0745   * 119* 110    138 139   K 3.6 4.1 4.1    106 105   CO2 20* 18* 20*   BUN 13 14 15   CREATININE 1.4 1.5* 1.5*   CALCIUM 9.7 9.8 9.9   MG  --  1.8 1.8     Urine Culture: No results for input(s): "LABURIN" in the last 168 hours.  Urine Studies:   Recent Labs   Lab 05/11/25  1549   COLORU Yellow   APPEARANCEUA Cloudy*   PHUR 6.0   SPECGRAV <=1.005*   PROTEINUA 1+*   GLUCUA Negative   BILIRUBINUA Negative   OCCULTUA 1+*   NITRITE Negative   UROBILINOGEN Negative   LEUKOCYTESUR 2+*   RBCUA 15*   WBCUA >100*   BACTERIA Many*   HYALINECASTS 0       Significant Imaging:  CT: I have reviewed all results within the past 24 hours and my personal findings are:  as noted in HPI                   "

## 2025-05-12 NOTE — ASSESSMENT & PLAN NOTE
- Mr. Dhruv Zelayaderik Conroy. Presents with abdominal pain after recent UTI diagnosis  - he was initially started on Bactrim which was discontinued after he had rising liver enzymes  - UA in ED shows a nitrite negative UTI with greater than 100 WBCs/HPF, 2+ leuk esterase, many bacteria.  - CT of the abdomen and pelvis showed numerous kidney lesions, and enhancement of the right kidney suggesting pyelonephritis  - he was treated in the ED with Rocephin; continue daily Rocephin  - urine culture pending  - consult urology

## 2025-05-12 NOTE — CONSULTS
[facility] Surg ([address])  Urology  Consult Note    Patient Name: Dhruv Campos Jr.  MRN: [id number]  Admission Date: 5/12/2025  Hospital Length of Stay: 0   Code Status: Full Code   Attending Provider: Jesse Colvin MD   Consulting Provider: Addison Ponce DO  Primary Care Physician: Mayelin Russell MD  Principal Problem:<principal problem not specified>    Inpatient consult to Urology  Consult performed by: Addison Ponce DO  Consult ordered by: Jesse Colvin MD  Reason for consult: Pyelonephritis          Subjective:     HPI:  Mr. Dhruv Campos Jr. Is a 54 y.o. male, with PMH of HTN, HLD, paroxysmal A. Fib (not anticoagulated), gout, PUD, who presented to [facility] ED on 5/11/25 due to abdominal pain of several days duration. CT performed showed bilateral polycystic kidneys. Urology was consulted for evaluation of pyelonephritis.     On assessment the patient is afebrile, tachycardic to 120, otherwise vitally stable. He states he saw his PCP, was diagnosed with a UTI, and started on Bactrim, which was discontinued due to elevated liver enzymes. He complains of  fever, nausea, vomiting and chills. Denies hematuria, flank pain. 30 pack year history.     WBC 14.05 from 15.03 on admission. Hg 13.9. Cr 1.5 (1.3 in 2021). UA nitrite negative, 15 RBC, >100 WBC and many bacteria.     CT with bilateral polycystic kidneys, no obvious hydronephrosis, no obstructing stones.             Past Medical History:   Diagnosis Date    Anxiety     Atrial fibrillation     Gout     Hypercholesterolemia     Hypertension     Peptic ulcer disease        Past Surgical History:   Procedure Laterality Date    COLONOSCOPY N/A 10/21/2021    Procedure: COLONOSCOPY;  Surgeon: Dieudonne Romero MD;  [facility];  Service: Colon and Rectal;  Laterality: N/A;    HEMORRHOIDECTOMY INVOLVING TWO OR MORE ANAL COLUMNS N/A 12/10/2021    Procedure: HEMORRHOIDECTOMY INVOLVING TWO OR MORE ANAL COLUMNS;  Surgeon: Dieudonne  TANO Romero MD;  Location: [facility] OR [address] [place] [address];  Service: Colon and Rectal;  Laterality: N/A;       Review of patient's allergies indicates:  No Known Allergies    Family History    None         Tobacco Use    Smoking status: Former    Smokeless tobacco: Never   Substance and Sexual Activity    Alcohol use: Never    Drug use: Never    Sexual activity: Not on file       Review of Systems   Constitutional:  Positive for chills and fever.   Genitourinary:  Negative for difficulty urinating.       Objective:     Temp:  [98.4 °F (36.9 °C)-102.9 °F (39.4 °C)] 99.3 °F (37.4 °C)  Pulse:  [] 120  Resp:  [17-18] 18  SpO2:  [90 %-96 %] 94 %  BP: (116-144)/(62-97) 141/76  Weight: 94.2 kg (207 lb 10.8 oz)  Body mass index is 34.56 kg/m².           Drains       None                    Physical Exam  Constitutional:       General: He is not in acute distress.     Appearance: Normal appearance.   HENT:      Head: Normocephalic and atraumatic.   Eyes:      Extraocular Movements: Extraocular movements intact.      Pupils: Pupils are equal, round, and reactive to light.   Cardiovascular:      Rate and Rhythm: Normal rate.   Pulmonary:      Effort: Pulmonary effort is normal. No respiratory distress.   Abdominal:      General: Abdomen is flat. There is no distension.      Tenderness: There is no abdominal tenderness. There is no right CVA tenderness or left CVA tenderness.   Skin:     Coloration: Skin is not jaundiced.   Neurological:      General: No focal deficit present.      Mental Status: He is alert and oriented to person, place, and time.   Psychiatric:         Mood and Affect: Mood normal.         Behavior: Behavior normal.          Significant Labs:    BMP:  Recent Labs   Lab 05/11/25  1603 05/12/25  0106 05/12/25  0745    138 139   K 3.6 4.1 4.1    106 105   CO2 20* 18* 20*   BUN 13 14 15   CREATININE 1.4 1.5* 1.5*   CALCIUM 9.7 9.8 9.9       CBC:  Recent Labs   Lab 05/11/25  1603 05/12/25  0106 05/12/25  0746  "  WBC 15.03* 16.43* 14.05*   HGB 14.1 14.7 13.9*   HCT 40.4 42.9 41.1    205 216       Blood Culture: No results for input(s): "LABBLOO" in the last 168 hours.  CMP:   Recent Labs   Lab 05/11/25  1603 05/12/25  0106 05/12/25  0745   * 119* 110    138 139   K 3.6 4.1 4.1    106 105   CO2 20* 18* 20*   BUN 13 14 15   CREATININE 1.4 1.5* 1.5*   CALCIUM 9.7 9.8 9.9   MG  --  1.8 1.8     Urine Culture: No results for input(s): "LABURIN" in the last 168 hours.  Urine Studies:   Recent Labs   Lab 05/11/25  1549   COLORU Yellow   APPEARANCEUA Cloudy*   PHUR 6.0   SPECGRAV <=1.005*   PROTEINUA 1+*   GLUCUA Negative   BILIRUBINUA Negative   OCCULTUA 1+*   NITRITE Negative   UROBILINOGEN Negative   LEUKOCYTESUR 2+*   RBCUA 15*   WBCUA >100*   BACTERIA Many*   HYALINECASTS 0       Significant Imaging:  CT: I have reviewed all results within the past 24 hours and my personal findings are:  as noted in HPI                     Assessment and Plan:     Pyelonephritis of right kidney  Mr. Dhruv Campos . Is a 54 y.o. male, with PMH of HTN, HLD, paroxysmal A. Fib (not anticoagulated), gout, PUD, who presented to [facility] ED on 5/11/25 due to abdominal pain of several days duration. CT performed showed bilateral polycystic kidneys. Urology was consulted for evaluation of pyelonephritis.     -- Recommend broad spectrum antibiotics  -- F/u urine cultures. Tailor to cultures and sensitivities  -- No obvious hydronephrosis, or obstructing stone.   -- PVR ordered to determine patients emptying capacity   -- Will need outpatient hematuria workup given patient is intermediate risk based on age and smoking history   -- Rest of care per primary         VTE Risk Mitigation (From admission, onward)           Ordered     IP VTE HIGH RISK PATIENT  Once         05/12/25 0056     Place sequential compression device  Until discontinued         05/12/25 0056                    Thank you for your consult. I " will follow-up with patient. Please contact us if you have any additional questions.    Addison Ponce, DO  Urology  [facility] - Med Surg ([address])

## 2025-05-12 NOTE — HPI
Mr. Dhruv Campos Jr. Is a 54 y.o. male, with PMH of HTN, HLD, paroxysmal A. Fib (not anticoagulated), gout, PUD, who presented to [facility] ED on 5/11/25 due to abdominal pain of several days duration. CT performed showed bilateral polycystic kidneys. Urology was consulted for evaluation of pyelonephritis.     On assessment the patient is afebrile, tachycardic to 120, otherwise vitally stable. He states he saw his PCP, was diagnosed with a UTI, and started on Bactrim, which was discontinued due to elevated liver enzymes. He complains of  fever, nausea, vomiting and chills. Denies hematuria, flank pain. 30 pack year history.     WBC 14.05 from 15.03 on admission. Hg 13.9. Cr 1.5 (1.3 in 2021). UA nitrite negative, 15 RBC, >100 WBC and many bacteria.     CT with bilateral polycystic kidneys, no obvious hydronephrosis, no obstructing stones.

## 2025-05-12 NOTE — ASSESSMENT & PLAN NOTE
Patient's blood pressure range in the last 24 hours was: BP  Min: 116/62  Max: 144/81.The patient's inpatient anti-hypertensive regimen is listed below:  Current Antihypertensives  losartan tablet 100 mg, Daily, Oral    Plan  - BP is controlled, no changes needed to their regimen

## 2025-05-12 NOTE — ASSESSMENT & PLAN NOTE
- elevated in setting of recent use of Bactrim  - upon arrival to [facility] labs repeated with drop in liver enzymes from AST 1052 AST 64, and ALT 2182   - monitor daily

## 2025-05-12 NOTE — PLAN OF CARE
05/12/25 1032   Rounds   Attendance Provider;Nurse    Discharge Plan A Home   Why the patient remains in the hospital Requires continued medical care   Transition of Care Barriers None

## 2025-05-12 NOTE — H&P
[facility] Surg [address] Medicine  History & Physical    Patient Name: Dhruv Campos Jr.  MRN: [id number]  Patient Class: IP- Inpatient  Admission Date: 5/12/2025  Attending Physician: BERNARD Kwok MD   Primary Care Provider: Mayelin Russell MD         Patient information was obtained from patient, past medical records, and ER records.     Subjective:     Principal Problem:<principal problem not specified>    Chief Complaint: No chief complaint on file.       HPI: Mr. Dhruv Campos Jr. Is a 54 y.o. male, with PMH of HTN, HLD, paroxysmal A. Fib (not anticoagulated), gout, PUD, who presented to [facility] ED on 5/11/25 due to abdominal pain beginning sever days PTA. He states he saw his PCP, was diagnosed with a UTI, and started on Bactrim, and subsequently was called and advised by his PCP to discontinue the Bactrim as his liver enzymes were increasing. He notes associated fever, nausea, vomiting and chills. ED labs showed leukocytosis of 15K. BUN was 13 and Cr was 1.4, and [id number],  with normal Tbili of 0.6. A UA showed 2+ leukocytes, with >100 WBC/hpf and many bacteria. A CXR was without acute findings. A CT of the abdomen and pelvis showed numerous kidney lesions, and enhancement of the right kidney suggesting pyelonephritis. Case discussed with Urology, who agree to consult. He was transferred to [facility]. He is admitted to inpatient status.     Past Medical History:   Diagnosis Date    Anxiety     Atrial fibrillation     Gout     Hypercholesterolemia     Hypertension     Peptic ulcer disease        Past Surgical History:   Procedure Laterality Date    COLONOSCOPY N/A 10/21/2021    Procedure: COLONOSCOPY;  Surgeon: Dieudonne Romero MD;  Location: [facility];  Service: Colon and Rectal;  Laterality: N/A;    HEMORRHOIDECTOMY INVOLVING TWO OR MORE ANAL COLUMNS N/A 12/10/2021    Procedure: HEMORRHOIDECTOMY INVOLVING TWO OR MORE ANAL COLUMNS;  Surgeon: Dieudonne Romero MD;   Location: [facility] OR [address] [place] [address];  Service: Colon and Rectal;  Laterality: N/A;       Review of patient's allergies indicates:  No Known Allergies    Current Facility-Administered Medications on File Prior to Encounter   Medication    0.9%  NaCl infusion    [COMPLETED] acetaminophen tablet 650 mg    [COMPLETED] cefTRIAXone injection 2 g    [COMPLETED] HYDROmorphone injection 0.5 mg    [COMPLETED] iohexoL (OMNIPAQUE 350) injection 100 mL    [COMPLETED] ketorolac injection 15 mg    mupirocin 2 % ointment    [COMPLETED] ondansetron injection 4 mg    [COMPLETED] oxyCODONE-acetaminophen 5-325 mg per tablet 1 tablet     Current Outpatient Medications on File Prior to Encounter   Medication Sig    allopurinoL (ZYLOPRIM) 300 MG tablet Take 300 mg by mouth.    butalbital-acetaminophen-caffeine -40 mg (FIORICET, ESGIC) -40 mg per tablet Take 1 tablet by mouth every 4 (four) hours as needed.    FLUoxetine 20 MG tablet Take 20 mg by mouth every morning.    losartan (COZAAR) 100 MG tablet Take 100 mg by mouth once daily.    lovastatin (MEVACOR) 40 MG tablet Take 40 mg by mouth every evening.    pantoprazole (PROTONIX) 40 MG tablet Take 40 mg by mouth every morning.    gabapentin (NEURONTIN) 300 MG capsule Take 300 mg by mouth every evening.    HYDROcodone-acetaminophen (NORCO) 7.5-325 mg per tablet Take 1 tablet by mouth every 6 (six) hours as needed.    meloxicam (MOBIC) 15 MG tablet Take 15 mg by mouth.    methylPREDNISolone (MEDROL DOSEPACK) 4 mg tablet Take by mouth.    sulfamethoxazole-trimethoprim 800-160mg (BACTRIM DS) 800-160 mg Tab Take 1 tablet by mouth 2 (two) times daily.     Family History    None       Tobacco Use    Smoking status: Former    Smokeless tobacco: Never   Substance and Sexual Activity    Alcohol use: Never    Drug use: Never    Sexual activity: Not on file     Review of Systems   Constitutional:  Positive for chills, diaphoresis and fever.   HENT:  Negative for ear pain.    Respiratory:  Negative  for cough, shortness of breath and wheezing.    Cardiovascular:  Negative for chest pain and palpitations.   Gastrointestinal:  Negative for abdominal pain, constipation, diarrhea and nausea.   Genitourinary:  Negative for difficulty urinating, dysuria, frequency, hematuria and urgency.   Musculoskeletal:  Negative for back pain, joint swelling, myalgias, neck pain and neck stiffness.   Skin:  Negative for rash and wound.   Neurological:  Negative for dizziness, syncope, weakness, numbness and headaches.   Hematological:  Does not bruise/bleed easily.   Psychiatric/Behavioral:  Negative for agitation, confusion and decreased concentration.      Objective:     Vital Signs (Most Recent):  Temp: 99.3 °F (37.4 °C) (05/12/25 0411)  Pulse: 110 (05/12/25 0411)  Resp: 18 (05/12/25 0411)  BP: (!) 141/76 (05/12/25 0411)  SpO2: (!) 94 % (05/12/25 0418) Vital Signs (24h Range):  Temp:  [98.4 °F (36.9 °C)-102.9 °F (39.4 °C)] 99.3 °F (37.4 °C)  Pulse:  [] 110  Resp:  [17-18] 18  SpO2:  [90 %-96 %] 94 %  BP: (116-144)/(62-97) 141/76     Weight: 94.2 kg (207 lb 10.8 oz)  Body mass index is 34.56 kg/m².     Physical Exam  Vitals and nursing note reviewed.   Constitutional:       General: He is not in acute distress.     Appearance: Normal appearance. He is well-developed. He is obese. He is diaphoretic. He is not ill-appearing or toxic-appearing.   HENT:      Head: Normocephalic and atraumatic.      Right Ear: External ear normal.      Left Ear: External ear normal.   Eyes:      General: No scleral icterus.        Right eye: No discharge.         Left eye: No discharge.      Conjunctiva/sclera: Conjunctivae normal.   Neck:      Vascular: No JVD.      Trachea: No tracheal deviation.   Cardiovascular:      Rate and Rhythm: Normal rate and regular rhythm.      Heart sounds: Normal heart sounds. No murmur heard.     No gallop.   Pulmonary:      Effort: Pulmonary effort is normal. No respiratory distress.      Breath sounds: Normal  "breath sounds. No stridor. No wheezing or rales.   Abdominal:      General: Bowel sounds are normal. There is no distension.      Palpations: Abdomen is soft. There is no mass.      Tenderness: There is no abdominal tenderness. There is no guarding.   Musculoskeletal:         General: No deformity. Normal range of motion.      Cervical back: Normal range of motion and neck supple.   Skin:     General: Skin is warm.   Neurological:      General: No focal deficit present.      Mental Status: He is alert and oriented to person, place, and time.      Cranial Nerves: No cranial nerve deficit.      Motor: No abnormal muscle tone.      Coordination: Coordination normal.   Psychiatric:         Mood and Affect: Mood normal.         Behavior: Behavior normal.         Thought Content: Thought content normal.         Judgment: Judgment normal.                Significant Labs: All pertinent labs within the past 24 hours have been reviewed.  BMP:   Recent Labs   Lab 05/12/25  0106   *      K 4.1      CO2 18*   BUN 14   CREATININE 1.5*   CALCIUM 9.8   MG 1.8     CBC:   Recent Labs   Lab 05/11/25  1603 05/12/25  0106   WBC 15.03* 16.43*   HGB 14.1 14.7   HCT 40.4 42.9    205     CMP:   Recent Labs   Lab 05/11/25  1603 05/12/25  0106    138   K 3.6 4.1    106   CO2 20* 18*   * 119*   BUN 13 14   CREATININE 1.4 1.5*   CALCIUM 9.7 9.8   PROT 7.5 7.7   ALBUMIN 3.3* 3.2*   BILITOT 0.6 0.8   ALKPHOS 216* 192*   * 64*   * 188*   ANIONGAP 13 14     Urine Culture: No results for input(s): "LABURIN" in the last 48 hours.  Urine Studies:   Recent Labs   Lab 05/11/25  1549   COLORU Yellow   APPEARANCEUA Cloudy*   PHUR 6.0   SPECGRAV <=1.005*   PROTEINUA 1+*   GLUCUA Negative   BILIRUBINUA Negative   OCCULTUA 1+*   NITRITE Negative   UROBILINOGEN Negative   LEUKOCYTESUR 2+*   RBCUA 15*   WBCUA >100*   BACTERIA Many*   HYALINECASTS 0       Significant Imaging: I have reviewed all " pertinent imaging results/findings within the past 24 hours.       Assessment/Plan:     Assessment & Plan  Pyelonephritis of right kidney  - Mr. Dhruv Campos Jr. Presents with abdominal pain after recent UTI diagnosis  - he was initially started on Bactrim which was discontinued after he had rising liver enzymes  - UA in ED shows a nitrite negative UTI with greater than 100 WBCs/HPF, 2+ leuk esterase, many bacteria.  - CT of the abdomen and pelvis showed numerous kidney lesions, and enhancement of the right kidney suggesting pyelonephritis  - he was treated in the ED with Rocephin; continue daily Rocephin  - urine culture pending  - consult urology  Essential hypertension  Patient's blood pressure range in the last 24 hours was: BP  Min: 116/62  Max: 144/81.The patient's inpatient anti-hypertensive regimen is listed below:  Current Antihypertensives  losartan tablet 100 mg, Daily, Oral    Plan  - BP is controlled, no changes needed to their regimen    Elevated liver enzymes  - elevated in setting of recent use of Bactrim  - upon arrival to [facility] labs repeated with drop in liver enzymes from AST 1052 AST 64, and ALT 2182   - monitor daily      VTE Risk Mitigation (From admission, onward)           Ordered     IP VTE HIGH RISK PATIENT  Once         05/12/25 0056     Place sequential compression device  Until discontinued         05/12/25 0056                                    MARU HaqueC  Department of Hospital Medicine  [facility] - Med Surg ([address])

## 2025-05-12 NOTE — HPI
"Per Delaney Naik PA-C:    "Mr. Dhruv Campos Jr. Is a 54 y.o. male, with PMH of HTN, HLD, paroxysmal A. Fib (not anticoagulated), gout, PUD, who presented to [facility] ED on 5/11/25 due to abdominal pain beginning sever days PTA. He states he saw his PCP, was diagnosed with a UTI, and started on Bactrim, and subsequently was called and advised by his PCP to discontinue the Bactrim as his liver enzymes were increasing. He notes associated fever, nausea, vomiting and chills. ED labs showed leukocytosis of 15K. BUN was 13 and Cr was 1.4, and [id number],  with normal Tbili of 0.6. A UA showed 2+ leukocytes, with >100 WBC/hpf and many bacteria. A CXR was without acute findings. A CT of the abdomen and pelvis showed numerous kidney lesions, and enhancement of the right kidney suggesting pyelonephritis. Case discussed with Urology, who agree to consult. He was transferred to [facility]. He is admitted to inpatient status."  "

## 2025-05-12 NOTE — ASSESSMENT & PLAN NOTE
Mr. Dhruv Campos Jr. Is a 54 y.o. male, with PMH of HTN, HLD, paroxysmal A. Fib (not anticoagulated), gout, PUD, who presented to [facility] ED on 5/11/25 due to abdominal pain of several days duration. CT performed showed bilateral polycystic kidneys. Urology was consulted for evaluation of pyelonephritis.     -- Recommend broad spectrum antibiotics  -- F/u urine cultures. Tailor to cultures and sensitivities  -- No obvious hydronephrosis, or obstructing stone.   -- PVR ordered to determine patients emptying capacity   -- Will need outpatient hematuria workup given patient is intermediate risk based on age and smoking history   -- Rest of care per primary

## 2025-05-12 NOTE — PLAN OF CARE
Case Management Assessment     PCP: Mayelin Russell  Pharmacy: at bedside    Patient Arrived From: home  Existing Help at Home: spouse    Barriers to Discharge: none    Discharge Plan:    A. Home with family   B. Home with family      Patient is AAOx3 and independent at baseline. Patient denies owning any DME. PCP correct on facesheet. Patient spouse will transport patient home.          05/12/25 1125   Discharge Assessment   Assessment Type Discharge Planning Assessment   Confirmed/corrected address, phone number and insurance Yes   Confirmed Demographics Correct on Facesheet   Source of Information patient;family   Communicated FREEDOM with patient/caregiver Date not available/Unable to determine   Reason For Admission Pyelonephritis of right kidney   People in Home spouse   Do you expect to return to your current living situation? Yes   Do you have help at home or someone to help you manage your care at home? Yes   Who are your caregiver(s) and their phone number(s)? felicity rogers (Spouse)  [phone] (Home Phone)   Prior to hospitilization cognitive status: Alert/Oriented   Current cognitive status: Alert/Oriented   Walking or Climbing Stairs Difficulty no   Dressing/Bathing Difficulty no   Equipment Currently Used at Home none   Readmission within 30 days? No   Do you currently have service(s) that help you manage your care at home? No   Do you take prescription medications? Yes   Do you have prescription coverage? Yes   Coverage Payor: [place] [employer] - [facility] -   Do you have any problems affording any of your prescribed medications? No   Is the patient taking medications as prescribed? yes   Who is going to help you get home at discharge? felicity rogers (Spouse)  [phone] (Home Phone)   How do you get to doctors appointments? car, drives self;family or friend will provide   Are you on dialysis? No   Do you take coumadin? No   Discharge Plan A Home with family   Discharge Plan B Home with  family   DME Needed Upon Discharge  none   Discharge Plan discussed with: Spouse/sig other;Patient   Name(s) and Number(s) felicity rogers (Spouse)  [phone] (Home Phone)   Transition of Care Barriers None     [facility] ([address])  Initial Discharge Assessment       Primary Care Provider: Mayelin Russell MD    Admission Diagnosis: Pyelonephritis [N12]    Admission Date: 5/12/2025  Expected Discharge Date:     Transition of Care Barriers: (P) None    Payor: [employer] / Plan: [employer] [place] PPO / Product Type: PPO /     Extended Emergency Contact Information  Primary Emergency Contact: felicity rogers  Home Phone: [phone]  Relation: Spouse    Discharge Plan A: (P) Home with family  Discharge Plan B: (P) Home with family      [place] [id number] [facility] [place] [facility] [address]   [address] [facility] [address]  Phone: [phone] Fax: [phone]    [facility] [address] [place] [address]  Phone: [phone] Fax: [phone]      Initial Assessment (most recent)       Adult Discharge Assessment - 05/12/25 1125          Discharge Assessment    Assessment Type Discharge Planning Assessment (P)      Confirmed/corrected address, phone number and insurance Yes (P)      Confirmed Demographics Correct on Facesheet (P)      Source of Information patient;family (P)      Communicated FREEDOM with patient/caregiver Date not available/Unable to determine (P)      Reason For Admission Pyelonephritis of right kidney (P)      People in Home spouse (P)      Do you expect to return to your current living situation? Yes (P)      Do you have help at home or someone to help you manage your care at home? Yes (P)      Who are your caregiver(s) and their phone number(s)? felicity rogers (Spouse)  [phone] (Home Phone) (P)      Prior to hospitilization cognitive status: Alert/Oriented (P)      Current cognitive status: Alert/Oriented (P)      Walking or Climbing Stairs  Difficulty no (P)      Dressing/Bathing Difficulty no (P)      Equipment Currently Used at Home none (P)      Readmission within 30 days? No (P)      Do you currently have service(s) that help you manage your care at home? No (P)      Do you take prescription medications? Yes (P)      Do you have prescription coverage? Yes (P)      Coverage Payor: [facility] - (P)      Do you have any problems affording any of your prescribed medications? No (P)      Is the patient taking medications as prescribed? yes (P)      Who is going to help you get home at discharge? felicity rogers (Spouse)  [phone] (Home Phone) (P)      How do you get to doctors appointments? car, drives self;family or friend will provide (P)      Are you on dialysis? No (P)      Do you take coumadin? No (P)      Discharge Plan A Home with family (P)      Discharge Plan B Home with family (P)      DME Needed Upon Discharge  none (P)      Discharge Plan discussed with: Spouse/sig other;Patient (P)      Name(s) and Number(s) felicity rogers (Spouse)  [phone] (Home Phone) (P)      Transition of Care Barriers None (P)         Physical Activity    On average, how many days per week do you engage in moderate to strenuous exercise (like a brisk walk)? 5 days (P)      On average, how many minutes do you engage in exercise at this level? 60 min (P)         Financial Resource Strain    How hard is it for you to pay for the very basics like food, housing, medical care, and heating? Not hard at all (P)         Housing Stability    In the last 12 months, was there a time when you were not able to pay the mortgage or rent on time? No (P)      At any time in the past 12 months, were you homeless or living in a shelter (including now)? No (P)         Transportation Needs    In the past 12 months, has lack of transportation kept you from medical appointments or from getting medications? No (P)      In the past 12 months, has lack of  transportation kept you from meetings, work, or from getting things needed for daily living? No (P)         Food Insecurity    Within the past 12 months, you worried that your food would run out before you got the money to buy more. Never true (P)      Within the past 12 months, the food you bought just didn't last and you didn't have money to get more. Never true (P)         Stress    Do you feel stress - tense, restless, nervous, or anxious, or unable to sleep at night because your mind is troubled all the time - these days? Not at all (P)         Social Isolation    How often do you feel lonely or isolated from those around you?  Never (P)         Alcohol Use    Q1: How often do you have a drink containing alcohol? Never (P)      Q2: How many drinks containing alcohol do you have on a typical day when you are drinking? Patient does not drink (P)      Q3: How often do you have six or more drinks on one occasion? Never (P)         Utilities    In the past 12 months has the electric, gas, oil, or water company threatened to shut off services in your home? No (P)         Health Literacy    How often do you need to have someone help you when you read instructions, pamphlets, or other written material from your doctor or pharmacy? Never (P)         OTHER    Name(s) of People in Home felicity rogers (Spouse)  [phone] (Home Phone) (P)

## 2025-05-12 NOTE — SUBJECTIVE & OBJECTIVE
Past Medical History:   Diagnosis Date    Anxiety     Atrial fibrillation     Gout     Hypercholesterolemia     Hypertension     Peptic ulcer disease        Past Surgical History:   Procedure Laterality Date    COLONOSCOPY N/A 10/21/2021    Procedure: COLONOSCOPY;  Surgeon: Dieudonne Romero MD;  Location: [facility];  Service: Colon and Rectal;  Laterality: N/A;    HEMORRHOIDECTOMY INVOLVING TWO OR MORE ANAL COLUMNS N/A 12/10/2021    Procedure: HEMORRHOIDECTOMY INVOLVING TWO OR MORE ANAL COLUMNS;  Surgeon: Dieudonne Romero MD;  Location[facility] OR [address] [place] [address];  Service: Colon and Rectal;  Laterality: N/A;       Review of patient's allergies indicates:  No Known Allergies    Current Facility-Administered Medications on File Prior to Encounter   Medication    0.9%  NaCl infusion    [COMPLETED] acetaminophen tablet 650 mg    [COMPLETED] cefTRIAXone injection 2 g    [COMPLETED] HYDROmorphone injection 0.5 mg    [COMPLETED] iohexoL (OMNIPAQUE 350) injection 100 mL    [COMPLETED] ketorolac injection 15 mg    mupirocin 2 % ointment    [COMPLETED] ondansetron injection 4 mg    [COMPLETED] oxyCODONE-acetaminophen 5-325 mg per tablet 1 tablet     Current Outpatient Medications on File Prior to Encounter   Medication Sig    allopurinoL (ZYLOPRIM) 300 MG tablet Take 300 mg by mouth.    butalbital-acetaminophen-caffeine -40 mg (FIORICET, ESGIC) -40 mg per tablet Take 1 tablet by mouth every 4 (four) hours as needed.    FLUoxetine 20 MG tablet Take 20 mg by mouth every morning.    losartan (COZAAR) 100 MG tablet Take 100 mg by mouth once daily.    lovastatin (MEVACOR) 40 MG tablet Take 40 mg by mouth every evening.    pantoprazole (PROTONIX) 40 MG tablet Take 40 mg by mouth every morning.    gabapentin (NEURONTIN) 300 MG capsule Take 300 mg by mouth every evening.    HYDROcodone-acetaminophen (NORCO) 7.5-325 mg per tablet Take 1 tablet by mouth every 6 (six) hours as needed.    meloxicam (MOBIC) 15 MG tablet Take 15 mg by mouth.     methylPREDNISolone (MEDROL DOSEPACK) 4 mg tablet Take by mouth.    sulfamethoxazole-trimethoprim 800-160mg (BACTRIM DS) 800-160 mg Tab Take 1 tablet by mouth 2 (two) times daily.     Family History    None       Tobacco Use    Smoking status: Former    Smokeless tobacco: Never   Substance and Sexual Activity    Alcohol use: Never    Drug use: Never    Sexual activity: Not on file     Review of Systems   Constitutional:  Positive for chills, diaphoresis and fever.   HENT:  Negative for ear pain.    Respiratory:  Negative for cough, shortness of breath and wheezing.    Cardiovascular:  Negative for chest pain and palpitations.   Gastrointestinal:  Negative for abdominal pain, constipation, diarrhea and nausea.   Genitourinary:  Negative for difficulty urinating, dysuria, frequency, hematuria and urgency.   Musculoskeletal:  Negative for back pain, joint swelling, myalgias, neck pain and neck stiffness.   Skin:  Negative for rash and wound.   Neurological:  Negative for dizziness, syncope, weakness, numbness and headaches.   Hematological:  Does not bruise/bleed easily.   Psychiatric/Behavioral:  Negative for agitation, confusion and decreased concentration.      Objective:     Vital Signs (Most Recent):  Temp: 99.3 °F (37.4 °C) (05/12/25 0411)  Pulse: 110 (05/12/25 0411)  Resp: 18 (05/12/25 0411)  BP: (!) 141/76 (05/12/25 0411)  SpO2: (!) 94 % (05/12/25 0418) Vital Signs (24h Range):  Temp:  [98.4 °F (36.9 °C)-102.9 °F (39.4 °C)] 99.3 °F (37.4 °C)  Pulse:  [] 110  Resp:  [17-18] 18  SpO2:  [90 %-96 %] 94 %  BP: (116-144)/(62-97) 141/76     Weight: 94.2 kg (207 lb 10.8 oz)  Body mass index is 34.56 kg/m².     Physical Exam  Vitals and nursing note reviewed.   Constitutional:       General: He is not in acute distress.     Appearance: Normal appearance. He is well-developed. He is obese. He is diaphoretic. He is not ill-appearing or toxic-appearing.   HENT:      Head: Normocephalic and atraumatic.      Right  Ear: External ear normal.      Left Ear: External ear normal.   Eyes:      General: No scleral icterus.        Right eye: No discharge.         Left eye: No discharge.      Conjunctiva/sclera: Conjunctivae normal.   Neck:      Vascular: No JVD.      Trachea: No tracheal deviation.   Cardiovascular:      Rate and Rhythm: Normal rate and regular rhythm.      Heart sounds: Normal heart sounds. No murmur heard.     No gallop.   Pulmonary:      Effort: Pulmonary effort is normal. No respiratory distress.      Breath sounds: Normal breath sounds. No stridor. No wheezing or rales.   Abdominal:      General: Bowel sounds are normal. There is no distension.      Palpations: Abdomen is soft. There is no mass.      Tenderness: There is no abdominal tenderness. There is no guarding.   Musculoskeletal:         General: No deformity. Normal range of motion.      Cervical back: Normal range of motion and neck supple.   Skin:     General: Skin is warm.   Neurological:      General: No focal deficit present.      Mental Status: He is alert and oriented to person, place, and time.      Cranial Nerves: No cranial nerve deficit.      Motor: No abnormal muscle tone.      Coordination: Coordination normal.   Psychiatric:         Mood and Affect: Mood normal.         Behavior: Behavior normal.         Thought Content: Thought content normal.         Judgment: Judgment normal.                Significant Labs: All pertinent labs within the past 24 hours have been reviewed.  BMP:   Recent Labs   Lab 05/12/25  0106   *      K 4.1      CO2 18*   BUN 14   CREATININE 1.5*   CALCIUM 9.8   MG 1.8     CBC:   Recent Labs   Lab 05/11/25  1603 05/12/25  0106   WBC 15.03* 16.43*   HGB 14.1 14.7   HCT 40.4 42.9    205     CMP:   Recent Labs   Lab 05/11/25  1603 05/12/25  0106    138   K 3.6 4.1    106   CO2 20* 18*   * 119*   BUN 13 14   CREATININE 1.4 1.5*   CALCIUM 9.7 9.8   PROT 7.5 7.7   ALBUMIN 3.3* 3.2*  "  BILITOT 0.6 0.8   ALKPHOS 216* 192*   * 64*   * 188*   ANIONGAP 13 14     Urine Culture: No results for input(s): "LABURIN" in the last 48 hours.  Urine Studies:   Recent Labs   Lab 05/11/25  1549   COLORU Yellow   APPEARANCEUA Cloudy*   PHUR 6.0   SPECGRAV <=1.005*   PROTEINUA 1+*   GLUCUA Negative   BILIRUBINUA Negative   OCCULTUA 1+*   NITRITE Negative   UROBILINOGEN Negative   LEUKOCYTESUR 2+*   RBCUA 15*   WBCUA >100*   BACTERIA Many*   HYALINECASTS 0       Significant Imaging: I have reviewed all pertinent imaging results/findings within the past 24 hours.     "

## 2025-05-12 NOTE — PLAN OF CARE
Pt lying in bed. Assessment completed; no pain or distress reported. Call light within reach and bed lowered.     1700  VSS on RA and febrile this shift (see MAR for PRN actions).  Pain managed this shift. Good appetite and good UOP this shift. Repositions self independently in bed and ambulating around room. Free from injury or skin breakdown. Fall precautions maintained and call light in reach. POC updated questions answered and comments acknowledged.  Purposeful hourly rounding completed this shift.

## 2025-05-13 LAB
ABSOLUTE EOSINOPHIL (OHS): 0.06 K/UL
ABSOLUTE MONOCYTE (OHS): 1.31 K/UL (ref 0.3–1)
ABSOLUTE NEUTROPHIL COUNT (OHS): 8.65 K/UL (ref 1.8–7.7)
ANION GAP (OHS): 9 MMOL/L (ref 8–16)
BASOPHILS # BLD AUTO: 0.03 K/UL
BASOPHILS NFR BLD AUTO: 0.3 %
BUN SERPL-MCNC: 20 MG/DL (ref 6–20)
CALCIUM SERPL-MCNC: 9.3 MG/DL (ref 8.7–10.5)
CHLORIDE SERPL-SCNC: 108 MMOL/L (ref 95–110)
CO2 SERPL-SCNC: 20 MMOL/L (ref 23–29)
CREAT SERPL-MCNC: 1.4 MG/DL (ref 0.5–1.4)
ERYTHROCYTE [DISTWIDTH] IN BLOOD BY AUTOMATED COUNT: 13.2 % (ref 11.5–14.5)
GFR SERPLBLD CREATININE-BSD FMLA CKD-EPI: 60 ML/MIN/1.73/M2
GLUCOSE SERPL-MCNC: 109 MG/DL (ref 70–110)
HCT VFR BLD AUTO: 37.4 % (ref 40–54)
HGB BLD-MCNC: 12.3 GM/DL (ref 14–18)
IMM GRANULOCYTES # BLD AUTO: 0.1 K/UL (ref 0–0.04)
IMM GRANULOCYTES NFR BLD AUTO: 0.9 % (ref 0–0.5)
LYMPHOCYTES # BLD AUTO: 0.74 K/UL (ref 1–4.8)
MAGNESIUM SERPL-MCNC: 1.9 MG/DL (ref 1.6–2.6)
MCH RBC QN AUTO: 31.4 PG (ref 27–31)
MCHC RBC AUTO-ENTMCNC: 32.9 G/DL (ref 32–36)
MCV RBC AUTO: 95 FL (ref 82–98)
NUCLEATED RBC (/100WBC) (OHS): 0 /100 WBC
PHOSPHATE SERPL-MCNC: 2.4 MG/DL (ref 2.7–4.5)
PLATELET # BLD AUTO: 192 K/UL (ref 150–450)
PMV BLD AUTO: 10.6 FL (ref 9.2–12.9)
POTASSIUM SERPL-SCNC: 4.6 MMOL/L (ref 3.5–5.1)
RBC # BLD AUTO: 3.92 M/UL (ref 4.6–6.2)
RELATIVE EOSINOPHIL (OHS): 0.6 %
RELATIVE LYMPHOCYTE (OHS): 6.8 % (ref 18–48)
RELATIVE MONOCYTE (OHS): 12 % (ref 4–15)
RELATIVE NEUTROPHIL (OHS): 79.4 % (ref 38–73)
SODIUM SERPL-SCNC: 137 MMOL/L (ref 136–145)
WBC # BLD AUTO: 10.89 K/UL (ref 3.9–12.7)

## 2025-05-13 PROCEDURE — 99232 SBSQ HOSP IP/OBS MODERATE 35: CPT | Mod: ,,, | Performed by: UROLOGY

## 2025-05-13 PROCEDURE — A4216 STERILE WATER/SALINE, 10 ML: HCPCS | Performed by: PHYSICIAN ASSISTANT

## 2025-05-13 PROCEDURE — 63600175 PHARM REV CODE 636 W HCPCS: Performed by: HOSPITALIST

## 2025-05-13 PROCEDURE — 36415 COLL VENOUS BLD VENIPUNCTURE: CPT | Performed by: HOSPITALIST

## 2025-05-13 PROCEDURE — 84100 ASSAY OF PHOSPHORUS: CPT | Performed by: HOSPITALIST

## 2025-05-13 PROCEDURE — 85025 COMPLETE CBC W/AUTO DIFF WBC: CPT | Performed by: HOSPITALIST

## 2025-05-13 PROCEDURE — 21400001 HC TELEMETRY ROOM

## 2025-05-13 PROCEDURE — 83735 ASSAY OF MAGNESIUM: CPT | Performed by: HOSPITALIST

## 2025-05-13 PROCEDURE — 25000003 PHARM REV CODE 250: Performed by: HOSPITALIST

## 2025-05-13 PROCEDURE — 84295 ASSAY OF SERUM SODIUM: CPT | Performed by: HOSPITALIST

## 2025-05-13 PROCEDURE — 25000003 PHARM REV CODE 250: Performed by: PHYSICIAN ASSISTANT

## 2025-05-13 RX ADMIN — ALLOPURINOL 300 MG: 300 TABLET ORAL at 08:05

## 2025-05-13 RX ADMIN — KETOROLAC TROMETHAMINE 10 MG: 10 TABLET, FILM COATED ORAL at 06:05

## 2025-05-13 RX ADMIN — LOSARTAN POTASSIUM 100 MG: 50 TABLET, FILM COATED ORAL at 08:05

## 2025-05-13 RX ADMIN — Medication 6 MG: at 09:05

## 2025-05-13 RX ADMIN — PIPERACILLIN AND TAZOBACTAM 4.5 G: 4; .5 INJECTION, POWDER, LYOPHILIZED, FOR SOLUTION INTRAVENOUS at 04:05

## 2025-05-13 RX ADMIN — ACETAMINOPHEN 650 MG: 325 TABLET, FILM COATED ORAL at 04:05

## 2025-05-13 RX ADMIN — ACETAMINOPHEN 650 MG: 325 TABLET, FILM COATED ORAL at 06:05

## 2025-05-13 RX ADMIN — KETOROLAC TROMETHAMINE 10 MG: 10 TABLET, FILM COATED ORAL at 04:05

## 2025-05-13 RX ADMIN — PIPERACILLIN AND TAZOBACTAM 4.5 G: 4; .5 INJECTION, POWDER, LYOPHILIZED, FOR SOLUTION INTRAVENOUS at 01:05

## 2025-05-13 RX ADMIN — OXYCODONE HYDROCHLORIDE AND ACETAMINOPHEN 1 TABLET: 5; 325 TABLET ORAL at 09:05

## 2025-05-13 RX ADMIN — PIPERACILLIN AND TAZOBACTAM 4.5 G: 4; .5 INJECTION, POWDER, LYOPHILIZED, FOR SOLUTION INTRAVENOUS at 08:05

## 2025-05-13 RX ADMIN — Medication 10 ML: at 03:05

## 2025-05-13 RX ADMIN — SODIUM CHLORIDE: 9 INJECTION, SOLUTION INTRAVENOUS at 09:05

## 2025-05-13 RX ADMIN — FLUOXETINE HYDROCHLORIDE 20 MG: 20 CAPSULE ORAL at 08:05

## 2025-05-13 RX ADMIN — PANTOPRAZOLE SODIUM 40 MG: 40 TABLET, DELAYED RELEASE ORAL at 06:05

## 2025-05-13 RX ADMIN — Medication 10 ML: at 10:05

## 2025-05-13 NOTE — ASSESSMENT & PLAN NOTE
Mr. Dhruv Campos Jr. Is a 54 y.o. male, with PMH of HTN, HLD, paroxysmal A. Fib (not anticoagulated), gout, PUD, who presented to [facility] ED on 5/11/25 due to abdominal pain of several days duration. CT performed showed bilateral polycystic kidneys. Urology was consulted for evaluation of pyelonephritis.     -- Recommend broad spectrum antibiotics  -- F/u urine cultures. Tailor to cultures and sensitivities  -- No obvious hydronephrosis, or obstructing stone.   -- PVR 0 cc's indicating adequate bladder emptying.  -- Will need outpatient hematuria workup given patient is intermediate risk based on age and smoking history   -- No indication for acute urologic intervention  -- Rest of care per primary

## 2025-05-13 NOTE — PLAN OF CARE
Patient is A&O x4 and able to make need known. Getting treated with scheduled medications and antibiotics. Complains of no pain at this time. Clam and rested with call light in reach and family at the bedside.    Problem: Adult Inpatient Plan of Care  Goal: Plan of Care Review  Outcome: Progressing  Goal: Patient-Specific Goal (Individualized)  Outcome: Progressing  Goal: Absence of Hospital-Acquired Illness or Injury  Outcome: Progressing  Goal: Optimal Comfort and Wellbeing  Outcome: Progressing  Goal: Readiness for Transition of Care  Outcome: Progressing

## 2025-05-13 NOTE — PROGRESS NOTES
[facility] ([address])  Urology  Progress Note    Patient Name: Dhruv Campos Jr.  MRN: [id number]  Admission Date: 5/12/2025  Hospital Length of Stay: 1 days  Code Status: Full Code   Attending Provider: Dhruv Ann MD   Primary Care Physician: Mayelin Russell MD    Subjective:     HPI:  Mr. Dhruv Campos Jr. Is a 54 y.o. male, with PMH of HTN, HLD, paroxysmal A. Fib (not anticoagulated), gout, PUD, who presented to [facility] ED on 5/11/25 due to abdominal pain of several days duration. CT performed showed bilateral polycystic kidneys. Urology was consulted for evaluation of pyelonephritis.     On assessment the patient is afebrile, tachycardic to 120, otherwise vitally stable. He states he saw his PCP, was diagnosed with a UTI, and started on Bactrim, which was discontinued due to elevated liver enzymes. He complains of  fever, nausea, vomiting and chills. Denies hematuria, flank pain. 30 pack year history.     WBC 14.05 from 15.03 on admission. Hg 13.9. Cr 1.5 (1.3 in 2021). UA nitrite negative, 15 RBC, >100 WBC and many bacteria.     CT with bilateral polycystic kidneys, no obvious hydronephrosis, no obstructing stones.             Interval History: Febrile to 100.9 overnight. Patient states that malaise much improved after initiation of IV antibiotics. Tolerating PO diet without nausea, vomiting.       Objective:     Temp:  [98.1 °F (36.7 °C)-101.5 °F (38.6 °C)] 100.9 °F (38.3 °C)  Pulse:  [] 101  Resp:  [16-18] 18  SpO2:  [91 %-96 %] 91 %  BP: (115-138)/(67-81) 124/81     Body mass index is 34.56 kg/m².      Bladder Scan Volume (mL): 0 mL (05/12/25 1252)    Drains       None                    Physical Exam  Constitutional:       General: He is not in acute distress.     Appearance: Normal appearance.   HENT:      Head: Normocephalic and atraumatic.   Eyes:      Extraocular Movements: Extraocular movements intact.      Pupils: Pupils are equal, round, and reactive  to light.   Cardiovascular:      Rate and Rhythm: Normal rate.   Pulmonary:      Effort: Pulmonary effort is normal. No respiratory distress.   Abdominal:      General: Abdomen is flat. There is no distension.      Tenderness: There is no abdominal tenderness. There is no right CVA tenderness or left CVA tenderness.   Skin:     Coloration: Skin is not jaundiced.   Neurological:      General: No focal deficit present.      Mental Status: He is alert and oriented to person, place, and time.   Psychiatric:         Mood and Affect: Mood normal.         Behavior: Behavior normal.           Significant Labs:    BMP:  Recent Labs   Lab 05/12/25  0106 05/12/25  0745 05/13/25  0330    139 137   K 4.1 4.1 4.6    105 108   CO2 18* 20* 20*   BUN 14 15 20   CREATININE 1.5* 1.5* 1.4   CALCIUM 9.8 9.9 9.3       CBC:   Recent Labs   Lab 05/12/25  0106 05/12/25  0746 05/13/25  0330   WBC 16.43* 14.05* 10.89   HGB 14.7 13.9* 12.3*   HCT 42.9 41.1 37.4*    216 192       All pertinent labs results from the past 24 hours have been reviewed.    Significant Imaging:  All pertinent imaging results/findings from the past 24 hours have been reviewed.                  Assessment/Plan:     Pyelonephritis of right kidney  Mr. Dhruv Campos Jr. Is a 54 y.o. male, with PMH of HTN, HLD, paroxysmal A. Fib (not anticoagulated), gout, PUD, who presented to [facility] ED on 5/11/25 due to abdominal pain of several days duration. CT performed showed bilateral polycystic kidneys. Urology was consulted for evaluation of pyelonephritis.     -- Recommend broad spectrum antibiotics  -- F/u urine cultures. Tailor to cultures and sensitivities  -- No obvious hydronephrosis, or obstructing stone.   -- PVR 0 cc's indicating adequate bladder emptying.  -- Will need outpatient hematuria workup given patient is intermediate risk based on age and smoking history   -- No indication for acute urologic intervention  -- Rest of care per  primary         VTE Risk Mitigation (From admission, onward)           Ordered     IP VTE HIGH RISK PATIENT  Once         05/12/25 0056     Place sequential compression device  Until discontinued         05/12/25 0056                    Addison Ponce DO  Urology  [facility] ([address])

## 2025-05-13 NOTE — SUBJECTIVE & OBJECTIVE
Interval History: Febrile to 100.9 overnight. Patient states that malaise much improved after initiation of IV antibiotics. Tolerating PO diet without nausea, vomiting.       Objective:     Temp:  [98.1 °F (36.7 °C)-101.5 °F (38.6 °C)] 100.9 °F (38.3 °C)  Pulse:  [] 101  Resp:  [16-18] 18  SpO2:  [91 %-96 %] 91 %  BP: (115-138)/(67-81) 124/81     Body mass index is 34.56 kg/m².      Bladder Scan Volume (mL): 0 mL (05/12/25 1252)    Drains       None                    Physical Exam  Constitutional:       General: He is not in acute distress.     Appearance: Normal appearance.   HENT:      Head: Normocephalic and atraumatic.   Eyes:      Extraocular Movements: Extraocular movements intact.      Pupils: Pupils are equal, round, and reactive to light.   Cardiovascular:      Rate and Rhythm: Normal rate.   Pulmonary:      Effort: Pulmonary effort is normal. No respiratory distress.   Abdominal:      General: Abdomen is flat. There is no distension.      Tenderness: There is no abdominal tenderness. There is no right CVA tenderness or left CVA tenderness.   Skin:     Coloration: Skin is not jaundiced.   Neurological:      General: No focal deficit present.      Mental Status: He is alert and oriented to person, place, and time.   Psychiatric:         Mood and Affect: Mood normal.         Behavior: Behavior normal.           Significant Labs:    BMP:  Recent Labs   Lab 05/12/25  0106 05/12/25  0745 05/13/25  0330    139 137   K 4.1 4.1 4.6    105 108   CO2 18* 20* 20*   BUN 14 15 20   CREATININE 1.5* 1.5* 1.4   CALCIUM 9.8 9.9 9.3       CBC:   Recent Labs   Lab 05/12/25  0106 05/12/25  0746 05/13/25  0330   WBC 16.43* 14.05* 10.89   HGB 14.7 13.9* 12.3*   HCT 42.9 41.1 37.4*    216 192       All pertinent labs results from the past 24 hours have been reviewed.    Significant Imaging:  All pertinent imaging results/findings from the past 24 hours have been reviewed.

## 2025-05-13 NOTE — PLAN OF CARE
05/13/25 1236   Rounds   Attendance Provider;Nurse    Discharge Plan A Home with family   Why the patient remains in the hospital Requires continued medical care   Transition of Care Barriers None

## 2025-05-13 NOTE — CHAPLAIN
"   25 1413   Clinical Encounter Type   Visit Type Follow-up   Visit Category Spiritual Assessment;General Rounding   Visited With Patient and family together   Number of Family Visited 1  (wife, Torie)   Length of Visit 60 Minutes   Patient Spiritual Encounters   Care Provided Explored pt/family concerns;Grief care;Compassionate presence;Life review/ reflection;Reflective listening   Patient Coping Anger;Sadness;Living with uncertainty;Family/ friends resources   Comments - Patient Pt. and his wife, Torie, were present when I visited today. They described how they came from [place] the night before last when the pain was finally too bad to tolerate. Pt. reported that yesterday was the worst day for headaches/pain, but he was given an IV antibiotic that seems to be helping him. I suggested that physical issues are often a reflection of mental/emotional issues, and said that kidney are usually related to anger. Pt. 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 After listening, I asked if his self-described "control issues" are related to feeling powerless. Pt. tearfully reminisced abt his dad, who "was a bull," and his mom, who "was a doll." He expressed that he forgave his dad a long time ago, and it was clear that Pt. still has deep feelings abt his relationship w/ his  father. I led Pt. in putting it all in perspective: realizing that he was needing patience w/ his work situation, and finding himself a patient, and realizing that getting angry for not being able to control work situations is harming his own state of health/well-being. Pt. and Torie were fully engaged in making the connections and taking the circumstance into context. They both expressed that they can see the valuable lesson this has taught them, and remarked that they need to plan a vacation and take better care of their health and state of mind. " They expressed gratitude for the conversation and spiritual care.

## 2025-05-14 PROBLEM — M10.9 ACUTE GOUT OF LEFT FOOT: Status: ACTIVE | Noted: 2025-05-14

## 2025-05-14 LAB
ABSOLUTE EOSINOPHIL (OHS): 0.15 K/UL
ABSOLUTE MONOCYTE (OHS): 0.83 K/UL (ref 0.3–1)
ABSOLUTE NEUTROPHIL COUNT (OHS): 5.88 K/UL (ref 1.8–7.7)
ANION GAP (OHS): 10 MMOL/L (ref 8–16)
BACTERIA UR CULT: ABNORMAL
BASOPHILS # BLD AUTO: 0.03 K/UL
BASOPHILS NFR BLD AUTO: 0.4 %
BUN SERPL-MCNC: 17 MG/DL (ref 6–20)
CALCIUM SERPL-MCNC: 9 MG/DL (ref 8.7–10.5)
CHLORIDE SERPL-SCNC: 111 MMOL/L (ref 95–110)
CO2 SERPL-SCNC: 19 MMOL/L (ref 23–29)
CREAT SERPL-MCNC: 1.3 MG/DL (ref 0.5–1.4)
ERYTHROCYTE [DISTWIDTH] IN BLOOD BY AUTOMATED COUNT: 13.7 % (ref 11.5–14.5)
GFR SERPLBLD CREATININE-BSD FMLA CKD-EPI: >60 ML/MIN/1.73/M2
GLUCOSE SERPL-MCNC: 100 MG/DL (ref 70–110)
HCT VFR BLD AUTO: 33.5 % (ref 40–54)
HGB BLD-MCNC: 11.1 GM/DL (ref 14–18)
IMM GRANULOCYTES # BLD AUTO: 0.24 K/UL (ref 0–0.04)
IMM GRANULOCYTES NFR BLD AUTO: 3 % (ref 0–0.5)
LYMPHOCYTES # BLD AUTO: 0.82 K/UL (ref 1–4.8)
MAGNESIUM SERPL-MCNC: 1.9 MG/DL (ref 1.6–2.6)
MCH RBC QN AUTO: 31.7 PG (ref 27–31)
MCHC RBC AUTO-ENTMCNC: 33.1 G/DL (ref 32–36)
MCV RBC AUTO: 96 FL (ref 82–98)
NUCLEATED RBC (/100WBC) (OHS): 0 /100 WBC
PHOSPHATE SERPL-MCNC: 3.3 MG/DL (ref 2.7–4.5)
PLATELET # BLD AUTO: 195 K/UL (ref 150–450)
PMV BLD AUTO: 10.4 FL (ref 9.2–12.9)
POTASSIUM SERPL-SCNC: 4.5 MMOL/L (ref 3.5–5.1)
RBC # BLD AUTO: 3.5 M/UL (ref 4.6–6.2)
RELATIVE EOSINOPHIL (OHS): 1.9 %
RELATIVE LYMPHOCYTE (OHS): 10.3 % (ref 18–48)
RELATIVE MONOCYTE (OHS): 10.4 % (ref 4–15)
RELATIVE NEUTROPHIL (OHS): 74 % (ref 38–73)
SODIUM SERPL-SCNC: 140 MMOL/L (ref 136–145)
WBC # BLD AUTO: 7.95 K/UL (ref 3.9–12.7)

## 2025-05-14 PROCEDURE — 80048 BASIC METABOLIC PNL TOTAL CA: CPT | Performed by: HOSPITALIST

## 2025-05-14 PROCEDURE — 84100 ASSAY OF PHOSPHORUS: CPT | Performed by: HOSPITALIST

## 2025-05-14 PROCEDURE — 25000003 PHARM REV CODE 250: Performed by: PHYSICIAN ASSISTANT

## 2025-05-14 PROCEDURE — 85025 COMPLETE CBC W/AUTO DIFF WBC: CPT | Performed by: HOSPITALIST

## 2025-05-14 PROCEDURE — 99232 SBSQ HOSP IP/OBS MODERATE 35: CPT | Mod: ,,, | Performed by: UROLOGY

## 2025-05-14 PROCEDURE — 21400001 HC TELEMETRY ROOM

## 2025-05-14 PROCEDURE — A4216 STERILE WATER/SALINE, 10 ML: HCPCS | Performed by: PHYSICIAN ASSISTANT

## 2025-05-14 PROCEDURE — 25000003 PHARM REV CODE 250: Performed by: HOSPITALIST

## 2025-05-14 PROCEDURE — 63600175 PHARM REV CODE 636 W HCPCS: Performed by: HOSPITALIST

## 2025-05-14 PROCEDURE — 83735 ASSAY OF MAGNESIUM: CPT | Performed by: HOSPITALIST

## 2025-05-14 PROCEDURE — 94761 N-INVAS EAR/PLS OXIMETRY MLT: CPT

## 2025-05-14 PROCEDURE — 93005 ELECTROCARDIOGRAM TRACING: CPT

## 2025-05-14 PROCEDURE — 36415 COLL VENOUS BLD VENIPUNCTURE: CPT | Performed by: HOSPITALIST

## 2025-05-14 RX ORDER — OXYCODONE HYDROCHLORIDE 5 MG/1
5 TABLET ORAL EVERY 4 HOURS PRN
Refills: 0 | Status: DISCONTINUED | OUTPATIENT
Start: 2025-05-14 | End: 2025-05-15 | Stop reason: HOSPADM

## 2025-05-14 RX ORDER — BUTALBITAL, ACETAMINOPHEN AND CAFFEINE 50; 325; 40 MG/1; MG/1; MG/1
1 TABLET ORAL EVERY 6 HOURS PRN
Status: DISCONTINUED | OUTPATIENT
Start: 2025-05-14 | End: 2025-05-15 | Stop reason: HOSPADM

## 2025-05-14 RX ORDER — ACETAMINOPHEN 500 MG
1000 TABLET ORAL EVERY 8 HOURS PRN
Status: DISCONTINUED | OUTPATIENT
Start: 2025-05-14 | End: 2025-05-15 | Stop reason: HOSPADM

## 2025-05-14 RX ORDER — COLCHICINE 0.6 MG/1
0.6 TABLET, FILM COATED ORAL 2 TIMES DAILY
Status: DISCONTINUED | OUTPATIENT
Start: 2025-05-15 | End: 2025-05-15 | Stop reason: HOSPADM

## 2025-05-14 RX ORDER — ENOXAPARIN SODIUM 100 MG/ML
40 INJECTION SUBCUTANEOUS EVERY 24 HOURS
Status: DISCONTINUED | OUTPATIENT
Start: 2025-05-14 | End: 2025-05-15 | Stop reason: HOSPADM

## 2025-05-14 RX ORDER — ACETAMINOPHEN 500 MG
1000 TABLET ORAL EVERY 8 HOURS PRN
Status: DISCONTINUED | OUTPATIENT
Start: 2025-05-14 | End: 2025-05-14

## 2025-05-14 RX ORDER — COLCHICINE 0.6 MG/1
1.2 TABLET, FILM COATED ORAL ONCE
Status: COMPLETED | OUTPATIENT
Start: 2025-05-14 | End: 2025-05-14

## 2025-05-14 RX ADMIN — FLUOXETINE HYDROCHLORIDE 20 MG: 20 CAPSULE ORAL at 09:05

## 2025-05-14 RX ADMIN — ENOXAPARIN SODIUM 40 MG: 40 INJECTION SUBCUTANEOUS at 04:05

## 2025-05-14 RX ADMIN — COLCHICINE 1.2 MG: 0.6 TABLET, FILM COATED ORAL at 09:05

## 2025-05-14 RX ADMIN — KETOROLAC TROMETHAMINE 10 MG: 10 TABLET, FILM COATED ORAL at 05:05

## 2025-05-14 RX ADMIN — PIPERACILLIN AND TAZOBACTAM 4.5 G: 4; .5 INJECTION, POWDER, LYOPHILIZED, FOR SOLUTION INTRAVENOUS at 04:05

## 2025-05-14 RX ADMIN — PIPERACILLIN AND TAZOBACTAM 4.5 G: 4; .5 INJECTION, POWDER, LYOPHILIZED, FOR SOLUTION INTRAVENOUS at 01:05

## 2025-05-14 RX ADMIN — PANTOPRAZOLE SODIUM 40 MG: 40 TABLET, DELAYED RELEASE ORAL at 06:05

## 2025-05-14 RX ADMIN — PIPERACILLIN AND TAZOBACTAM 4.5 G: 4; .5 INJECTION, POWDER, LYOPHILIZED, FOR SOLUTION INTRAVENOUS at 09:05

## 2025-05-14 RX ADMIN — Medication 10 ML: at 06:05

## 2025-05-14 RX ADMIN — BUTALBITAL, ACETAMINOPHEN, AND CAFFEINE 1 TABLET: 325; 50; 40 TABLET ORAL at 01:05

## 2025-05-14 RX ADMIN — SODIUM CHLORIDE: 9 INJECTION, SOLUTION INTRAVENOUS at 01:05

## 2025-05-14 RX ADMIN — Medication 10 ML: at 02:05

## 2025-05-14 RX ADMIN — ALLOPURINOL 300 MG: 300 TABLET ORAL at 09:05

## 2025-05-14 RX ADMIN — LOSARTAN POTASSIUM 100 MG: 50 TABLET, FILM COATED ORAL at 09:05

## 2025-05-14 RX ADMIN — BUTALBITAL, ACETAMINOPHEN, AND CAFFEINE 1 TABLET: 325; 50; 40 TABLET ORAL at 08:05

## 2025-05-14 RX ADMIN — SODIUM CHLORIDE: 9 INJECTION, SOLUTION INTRAVENOUS at 05:05

## 2025-05-14 NOTE — ASSESSMENT & PLAN NOTE
- elevated in setting of recent use of Bactrim  - upon arrival to [facility] [place] repeated with drop in liver enzymes from AST 1052 AST 64, and ALT 2182   - monitor daily

## 2025-05-14 NOTE — ASSESSMENT & PLAN NOTE
- . Dhruv Campos . Presents with abdominal pain after recent UTI diagnosis  - he was initially started on Bactrim which was discontinued after he had rising liver enzymes  - UA in ED shows a nitrite negative UTI with greater than 100 WBCs/HPF, 2+ leuk esterase, many bacteria.  - CT of the abdomen and pelvis showed numerous kidney lesions, and enhancement of the right kidney suggesting pyelonephritis  On Zosyn  - urine culture positive > 100k gram negative rods  Consulted urology consulted, no intervention advised.

## 2025-05-14 NOTE — PROGRESS NOTES
[facility] Surg ([address])  Urology  Progress Note    Patient Name: Dhruv Campos Jr.  MRN: [id number]  Admission Date: 5/12/2025  Hospital Length of Stay: 2 days  Code Status: Full Code   Attending Provider: Dhruv Ann MD   Primary Care Physician: Mayelni Russell MD    Subjective:     HPI:  Mr. Dhruv Campos Jr. Is a 54 y.o. male, with PMH of HTN, HLD, paroxysmal A. Fib (not anticoagulated), gout, PUD, who presented to [facility] ED on 5/11/25 due to abdominal pain of several days duration. CT performed showed bilateral polycystic kidneys. Urology was consulted for evaluation of pyelonephritis.     On assessment the patient is afebrile, tachycardic to 120, otherwise vitally stable. He states he saw his PCP, was diagnosed with a UTI, and started on Bactrim, which was discontinued due to elevated liver enzymes. He complains of  fever, nausea, vomiting and chills. Denies hematuria, flank pain. 30 pack year history.     WBC 14.05 from 15.03 on admission. Hg 13.9. Cr 1.5 (1.3 in 2021). UA nitrite negative, 15 RBC, >100 WBC and many bacteria.     CT with bilateral polycystic kidneys, no obvious hydronephrosis, no obstructing stones.             Interval History: AFVSS. NAEO. Complains of a gout flare. Denies fevers, chills, flank pain. Ucx growing GNR's.       Objective:     Temp:  [97.6 °F (36.4 °C)-98.6 °F (37 °C)] 97.9 °F (36.6 °C)  Pulse:  [62-77] 75  Resp:  [14-18] 16  SpO2:  [94 %-97 %] 97 %  BP: (101-140)/(61-82) 140/82     Body mass index is 34.56 kg/m².      Bladder Scan Volume (mL): 0 mL (05/12/25 1252)    Drains       None                    Physical Exam  Constitutional:       General: He is not in acute distress.     Appearance: Normal appearance.   HENT:      Head: Normocephalic and atraumatic.   Eyes:      Extraocular Movements: Extraocular movements intact.      Pupils: Pupils are equal, round, and reactive to light.   Cardiovascular:      Rate and Rhythm: Normal rate.    Pulmonary:      Effort: Pulmonary effort is normal. No respiratory distress.   Abdominal:      General: Abdomen is flat. There is no distension.      Tenderness: There is no abdominal tenderness. There is no right CVA tenderness or left CVA tenderness.   Skin:     Coloration: Skin is not jaundiced.   Neurological:      General: No focal deficit present.      Mental Status: He is alert and oriented to person, place, and time.   Psychiatric:         Mood and Affect: Mood normal.         Behavior: Behavior normal.           Significant Labs:    BMP:  Recent Labs   Lab 05/12/25  0745 05/13/25  0330 05/14/25  0351    137 140   K 4.1 4.6 4.5    108 111*   CO2 20* 20* 19*   BUN 15 20 17   CREATININE 1.5* 1.4 1.3   CALCIUM 9.9 9.3 9.0       CBC:   Recent Labs   Lab 05/12/25  0746 05/13/25  0330 05/14/25  0351   WBC 14.05* 10.89 7.95   HGB 13.9* 12.3* 11.1*   HCT 41.1 37.4* 33.5*    192 195       All pertinent labs results from the past 24 hours have been reviewed.    Significant Imaging:  All pertinent imaging results/findings from the past 24 hours have been reviewed.                  Assessment/Plan:     Pyelonephritis of right kidney  Mr. Dhruv Campos Amira Is a 54 y.o. male, with PMH of HTN, HLD, paroxysmal A. Fib (not anticoagulated), gout, PUD, who presented to [facility] ED on 5/11/25 due to abdominal pain of several days duration. CT performed showed bilateral polycystic kidneys. Urology was consulted for evaluation of pyelonephritis.     -- Recommend broad spectrum antibiotics  -- F/u urine cultures. Tailor to cultures and sensitivities  -- No obvious hydronephrosis, or obstructing stone.   -- PVR 0 cc's indicating adequate bladder emptying.  -- Will need outpatient hematuria workup given patient is intermediate risk based on age and smoking history   -- No indication for acute urologic intervention  -- Rest of care per primary         VTE Risk Mitigation (From admission, onward)            Ordered     IP VTE HIGH RISK PATIENT  Once         05/12/25 0056     Place sequential compression device  Until discontinued         05/12/25 0056                    Addison Ponce DO  Urology  [employer] - Med Surg ([address])

## 2025-05-14 NOTE — ASSESSMENT & PLAN NOTE
Patient's blood pressure range in the last 24 hours was: BP  Min: 101/63  Max: 140/82.The patient's inpatient anti-hypertensive regimen is listed below:  Current Antihypertensives  losartan tablet 100 mg, Daily, Oral    Plan  - BP is controlled, no changes needed to their regimen

## 2025-05-14 NOTE — ASSESSMENT & PLAN NOTE
- Mr. Dhruv Campos . Presents with abdominal pain after recent UTI diagnosis  - he was initially started on Bactrim which was discontinued after he had rising liver enzymes  - UA in ED shows a nitrite negative UTI with greater than 100 WBCs/HPF, 2+ leuk esterase, many bacteria.  - CT of the abdomen and pelvis showed numerous kidney lesions, and enhancement of the right kidney suggesting pyelonephritis  - he was treated in the ED with Rocephin; continue daily Rocephin  - urine culture pending  Consulted urology consulted, no intervention advised.

## 2025-05-14 NOTE — PROGRESS NOTES
"[facility] Surg [address] Medicine  Progress Note    Patient Name: Dhruv Campos Jr.  MRN: [id number]  Patient Class: IP- Inpatient   Admission Date: 5/12/2025  Length of Stay: 2 days  Attending Physician: Dhruv Ann MD  Primary Care Provider: Mayelin Russell MD        Subjective     Principal Problem:Pyelonephritis of right kidney        HPI:  Per Delaney Naik, PA-C:    "Mr. Dhruv Campos Jr. Is a 54 y.o. male, with PMH of HTN, HLD, paroxysmal A. Fib (not anticoagulated), gout, PUD, who presented to [facility] ED on 5/11/25 due to abdominal pain beginning sever days PTA. He states he saw his PCP, was diagnosed with a UTI, and started on Bactrim, and subsequently was called and advised by his PCP to discontinue the Bactrim as his liver enzymes were increasing. He notes associated fever, nausea, vomiting and chills. ED labs showed leukocytosis of 15K. BUN was 13 and Cr was 1.4, and [id number],  with normal Tbili of 0.6. A UA showed 2+ leukocytes, with >100 WBC/hpf and many bacteria. A CXR was without acute findings. A CT of the abdomen and pelvis showed numerous kidney lesions, and enhancement of the right kidney suggesting pyelonephritis. Case discussed with Urology, who agree to consult. He was transferred to [facility]. He is admitted to inpatient status."    Overview/Hospital Course:  Patient is a 54 year man with history of hypertension, dyslipidemia, paroxysmal atrial fibrillation, and gout admitted with pyelonephritis and acute kidney injury.  Clinically improving with intravenous antibiotics. Evaluated by Urology and no intervention advised at this time.    Interval History: No acute events.  Feeling better.    Review of Systems   Constitutional:  Negative for chills and fever.   Respiratory:  Negative for shortness of breath.    Cardiovascular:  Negative for chest pain.   Gastrointestinal:  Negative for abdominal pain, constipation, diarrhea, nausea and " vomiting.     Objective:     VVS     Physical Exam  Cardiovascular:      Rate and Rhythm: Normal rate and regular rhythm.      Heart sounds: Normal heart sounds. No murmur heard.     No friction rub. No gallop.   Pulmonary:      Effort: Pulmonary effort is normal. No respiratory distress.      Breath sounds: Normal breath sounds. No wheezing or rales.   Abdominal:      General: Bowel sounds are normal. There is no distension.      Palpations: Abdomen is soft.      Tenderness: There is no abdominal tenderness.   Musculoskeletal:         General: No tenderness. Normal range of motion.   Skin:     General: Skin is warm and dry.      Coloration: Skin is not pale.      Findings: No erythema or rash.   Neurological:      Mental Status: He is alert and oriented to person, place, and time.               Significant Labs: All pertinent labs within the past 24 hours have been reviewed.    Significant Imaging: I have reviewed all pertinent imaging results/findings within the past 24 hours.      Assessment & Plan  Pyelonephritis of right kidney  - Mr. Dhruv Campos Amira Presents with abdominal pain after recent UTI diagnosis  - he was initially started on Bactrim which was discontinued after he had rising liver enzymes  - UA in ED shows a nitrite negative UTI with greater than 100 WBCs/HPF, 2+ leuk esterase, many bacteria.  - CT of the abdomen and pelvis showed numerous kidney lesions, and enhancement of the right kidney suggesting pyelonephritis  - he was treated in the ED with Rocephin; continue daily Rocephin  - urine culture pending  Consulted urology consulted, no intervention advised.  Essential hypertension  Patient's blood pressure range in the last 24 hours was: BP  Min: 101/63  Max: 140/82.The patient's inpatient anti-hypertensive regimen is listed below:  Current Antihypertensives  losartan tablet 100 mg, Daily, Oral    Plan  - BP is controlled, no changes needed to their regimen    Elevated liver enzymes  -  elevated in setting of recent use of Bactrim  - upon arrival to [facility] labs repeated with drop in liver enzymes from AST 1052 AST 64, and ALT 2182   - monitor daily    VTE Risk Mitigation (From admission, onward)           Ordered     IP VTE HIGH RISK PATIENT  Once         05/12/25 0056     Place sequential compression device  Until discontinued         05/12/25 0056                      Dhruv Ann MD  Department of Hospital Medicine   [employer] - Med Surg ([address])

## 2025-05-14 NOTE — SUBJECTIVE & OBJECTIVE
Interval History: AFVSS. NAEO. Complains of a gout flare. Denies fevers, chills, flank pain. Ucx growing GNR's.       Objective:     Temp:  [97.6 °F (36.4 °C)-98.6 °F (37 °C)] 97.9 °F (36.6 °C)  Pulse:  [62-77] 75  Resp:  [14-18] 16  SpO2:  [94 %-97 %] 97 %  BP: (101-140)/(61-82) 140/82     Body mass index is 34.56 kg/m².      Bladder Scan Volume (mL): 0 mL (05/12/25 1252)    Drains       None                    Physical Exam  Constitutional:       General: He is not in acute distress.     Appearance: Normal appearance.   HENT:      Head: Normocephalic and atraumatic.   Eyes:      Extraocular Movements: Extraocular movements intact.      Pupils: Pupils are equal, round, and reactive to light.   Cardiovascular:      Rate and Rhythm: Normal rate.   Pulmonary:      Effort: Pulmonary effort is normal. No respiratory distress.   Abdominal:      General: Abdomen is flat. There is no distension.      Tenderness: There is no abdominal tenderness. There is no right CVA tenderness or left CVA tenderness.   Skin:     Coloration: Skin is not jaundiced.   Neurological:      General: No focal deficit present.      Mental Status: He is alert and oriented to person, place, and time.   Psychiatric:         Mood and Affect: Mood normal.         Behavior: Behavior normal.           Significant Labs:    BMP:  Recent Labs   Lab 05/12/25  0745 05/13/25  0330 05/14/25  0351    137 140   K 4.1 4.6 4.5    108 111*   CO2 20* 20* 19*   BUN 15 20 17   CREATININE 1.5* 1.4 1.3   CALCIUM 9.9 9.3 9.0       CBC:   Recent Labs   Lab 05/12/25  0746 05/13/25  0330 05/14/25  0351   WBC 14.05* 10.89 7.95   HGB 13.9* 12.3* 11.1*   HCT 41.1 37.4* 33.5*    192 195       All pertinent labs results from the past 24 hours have been reviewed.    Significant Imaging:  All pertinent imaging results/findings from the past 24 hours have been reviewed.

## 2025-05-14 NOTE — SUBJECTIVE & OBJECTIVE
Interval History: No acute events overnight.  Feeling better.  Patient reports pain on 3rd digit of left foot typical of prior gout attacks.    Review of Systems   Constitutional:  Negative for chills, diaphoresis and fever.   Respiratory:  Negative for shortness of breath.    Cardiovascular:  Negative for chest pain.   Gastrointestinal:  Negative for abdominal pain, constipation, diarrhea, nausea and vomiting.     Objective:     Vital Signs (Most Recent):  Temp: 97.9 °F (36.6 °C) (05/14/25 0305)  Pulse: 75 (05/14/25 0305)  Resp: 16 (05/14/25 0305)  BP: (!) 140/82 (05/14/25 0305)  SpO2: 97 % (05/14/25 0305) Vital Signs (24h Range):  Temp:  [97.6 °F (36.4 °C)-98.6 °F (37 °C)] 97.9 °F (36.6 °C)  Pulse:  [62-75] 75  Resp:  [14-18] 16  SpO2:  [94 %-97 %] 97 %  BP: (101-140)/(61-82) 140/82     Weight: 94.2 kg (207 lb 10.8 oz)  Body mass index is 34.56 kg/m².    Intake/Output Summary (Last 24 hours) at 5/14/2025 0816  Last data filed at 5/14/2025 0306  Gross per 24 hour   Intake 200 ml   Output 700 ml   Net -500 ml         Physical Exam  Cardiovascular:      Rate and Rhythm: Normal rate and regular rhythm.      Heart sounds: Normal heart sounds. No murmur heard.     No friction rub. No gallop.   Pulmonary:      Effort: Pulmonary effort is normal. No respiratory distress.      Breath sounds: Normal breath sounds. No wheezing or rales.   Abdominal:      General: Bowel sounds are normal. There is no distension.      Palpations: Abdomen is soft.      Tenderness: There is no abdominal tenderness.   Musculoskeletal:         General: No tenderness. Normal range of motion.      Comments: Left foot toe mild tenderness without erythema or swelling   Skin:     General: Skin is warm and dry.      Coloration: Skin is not pale.      Findings: No erythema or rash.   Neurological:      Mental Status: He is alert and oriented to person, place, and time.               Significant Labs: All pertinent labs within the past 24 hours have been  reviewed.    Significant Imaging: I have reviewed all pertinent imaging results/findings within the past 24 hours.

## 2025-05-14 NOTE — PROGRESS NOTES
"[facility] Surg [address] Medicine  Progress Note    Patient Name: Drhuv Campos Jr.  MRN: [id number]  Patient Class: IP- Inpatient   Admission Date: 5/12/2025  Length of Stay: 2 days  Attending Physician: Dhruv Ann MD  Primary Care Provider: Mayelin Russell MD        Subjective     Principal Problem:Pyelonephritis of right kidney        HPI:  Per Delaney Naik, PA-C:    "Mr. Dhruv Campos Jr. Is a 54 y.o. male, with PMH of HTN, HLD, paroxysmal A. Fib (not anticoagulated), gout, PUD, who presented to [facility] ED on 5/11/25 due to abdominal pain beginning sever days PTA. He states he saw his PCP, was diagnosed with a UTI, and started on Bactrim, and subsequently was called and advised by his PCP to discontinue the Bactrim as his liver enzymes were increasing. He notes associated fever, nausea, vomiting and chills. ED labs showed leukocytosis of 15K. BUN was 13 and Cr was 1.4, and [id number],  with normal Tbili of 0.6. A UA showed 2+ leukocytes, with >100 WBC/hpf and many bacteria. A CXR was without acute findings. A CT of the abdomen and pelvis showed numerous kidney lesions, and enhancement of the right kidney suggesting pyelonephritis. Case discussed with Urology, who agree to consult. [place] [facility] He is admitted to inpatient status."    Overview/Hospital Course:  Patient is a 54 year man with history of hypertension, dyslipidemia, paroxysmal atrial fibrillation, and gout admitted with pyelonephritis and acute kidney injury.  Clinically improving with intravenous antibiotics. Evaluated by Urology and no intervention advised at this time.    Interval History: No acute events overnight.  Feeling better.  Patient reports pain on 3rd digit of left foot typical of prior gout attacks.    Review of Systems   Constitutional:  Negative for chills, diaphoresis and fever.   Respiratory:  Negative for shortness of breath.    Cardiovascular:  Negative for " chest pain.   Gastrointestinal:  Negative for abdominal pain, constipation, diarrhea, nausea and vomiting.     Objective:     Vital Signs (Most Recent):  Temp: 97.9 °F (36.6 °C) (05/14/25 0305)  Pulse: 75 (05/14/25 0305)  Resp: 16 (05/14/25 0305)  BP: (!) 140/82 (05/14/25 0305)  SpO2: 97 % (05/14/25 0305) Vital Signs (24h Range):  Temp:  [97.6 °F (36.4 °C)-98.6 °F (37 °C)] 97.9 °F (36.6 °C)  Pulse:  [62-75] 75  Resp:  [14-18] 16  SpO2:  [94 %-97 %] 97 %  BP: (101-140)/(61-82) 140/82     Weight: 94.2 kg (207 lb 10.8 oz)  Body mass index is 34.56 kg/m².    Intake/Output Summary (Last 24 hours) at 5/14/2025 0816  Last data filed at 5/14/2025 0306  Gross per 24 hour   Intake 200 ml   Output 700 ml   Net -500 ml         Physical Exam  Cardiovascular:      Rate and Rhythm: Normal rate and regular rhythm.      Heart sounds: Normal heart sounds. No murmur heard.     No friction rub. No gallop.   Pulmonary:      Effort: Pulmonary effort is normal. No respiratory distress.      Breath sounds: Normal breath sounds. No wheezing or rales.   Abdominal:      General: Bowel sounds are normal. There is no distension.      Palpations: Abdomen is soft.      Tenderness: There is no abdominal tenderness.   Musculoskeletal:         General: No tenderness. Normal range of motion.      Comments: Left foot toe mild tenderness without erythema or swelling   Skin:     General: Skin is warm and dry.      Coloration: Skin is not pale.      Findings: No erythema or rash.   Neurological:      Mental Status: He is alert and oriented to person, place, and time.               Significant Labs: All pertinent labs within the past 24 hours have been reviewed.    Significant Imaging: I have reviewed all pertinent imaging results/findings within the past 24 hours.      Assessment & Plan  Pyelonephritis of right kidney  - Mr. Dhruv Zelayaderik Conroy. Presents with abdominal pain after recent UTI diagnosis  - he was initially started on Bactrim which was  discontinued after he had rising liver enzymes  - UA in ED shows a nitrite negative UTI with greater than 100 WBCs/HPF, 2+ leuk esterase, many bacteria.  - CT of the abdomen and pelvis showed numerous kidney lesions, and enhancement of the right kidney suggesting pyelonephritis  On Zosyn  - urine culture positive > 100k gram negative rods  Consulted urology consulted, no intervention advised.  Essential hypertension  Patient's blood pressure range in the last 24 hours was: BP  Min: 101/63  Max: 140/82.The patient's inpatient anti-hypertensive regimen is listed below:  Current Antihypertensives  losartan tablet 100 mg, Daily, Oral    Plan  - BP is controlled, no changes needed to their regimen  Elevated liver enzymes  Mild, improving.  Acute gout of left foot  Mild in nature but causing pain.  Treat with colchicine  VTE Risk Mitigation (From admission, onward)           Ordered     IP VTE HIGH RISK PATIENT  Once         05/12/25 0056     Place sequential compression device  Until discontinued         05/12/25 0056                    Dhruv Ann MD  Department of Hospital Medicine   [employer] - Med Surg ([address])

## 2025-05-14 NOTE — HOSPITAL COURSE
Patient is a 54 year man with history of hypertension, dyslipidemia, paroxysmal atrial fibrillation, and gout admitted with pyelonephritis and acute kidney injury.  Clinically improving with intravenous antibiotics. Evaluated by Urology and no intervention advised at this time.  Urine culture positive for >100,000 cfu/ml Escherichia coli sensitive to fluoroquinolone therapy.  Risks of fluoroquinolone therapy discussed.  Electrocardiogram performed not show elevation and QTc.  Patient tolerating oral intake well now.  Patient transitioned to oral levofloxacin.  Hospital course also notable for mild gout flare treated with colchicine.  Recommended avoiding NSAID use due to mild acute kidney injury on presentation now resolved.  Patient discharged home in stable condition to complete a course of oral antibiotics with levofloxacin with outpatient follow up with Urology.  Close outpatient follow up with his primary care physician for ongoing management of medical comorbidities advised.  Patient advised to return to the hospital in the event of fever, chills, or flank pain or inability to tolerate his oral antibiotic therapy.

## 2025-05-14 NOTE — SUBJECTIVE & OBJECTIVE
Interval History: No acute events.  Feeling better.    Review of Systems   Constitutional:  Negative for chills and fever.   Respiratory:  Negative for shortness of breath.    Cardiovascular:  Negative for chest pain.   Gastrointestinal:  Negative for abdominal pain, constipation, diarrhea, nausea and vomiting.     Objective:     VVS     Physical Exam  Cardiovascular:      Rate and Rhythm: Normal rate and regular rhythm.      Heart sounds: Normal heart sounds. No murmur heard.     No friction rub. No gallop.   Pulmonary:      Effort: Pulmonary effort is normal. No respiratory distress.      Breath sounds: Normal breath sounds. No wheezing or rales.   Abdominal:      General: Bowel sounds are normal. There is no distension.      Palpations: Abdomen is soft.      Tenderness: There is no abdominal tenderness.   Musculoskeletal:         General: No tenderness. Normal range of motion.   Skin:     General: Skin is warm and dry.      Coloration: Skin is not pale.      Findings: No erythema or rash.   Neurological:      Mental Status: He is alert and oriented to person, place, and time.               Significant Labs: All pertinent labs within the past 24 hours have been reviewed.    Significant Imaging: I have reviewed all pertinent imaging results/findings within the past 24 hours.

## 2025-05-15 VITALS
OXYGEN SATURATION: 99 % | HEIGHT: 65 IN | HEART RATE: 66 BPM | WEIGHT: 207.69 LBS | TEMPERATURE: 98 F | DIASTOLIC BLOOD PRESSURE: 89 MMHG | BODY MASS INDEX: 34.6 KG/M2 | SYSTOLIC BLOOD PRESSURE: 147 MMHG | RESPIRATION RATE: 12 BRPM

## 2025-05-15 LAB
ABSOLUTE EOSINOPHIL (OHS): 0.1 K/UL
ABSOLUTE MONOCYTE (OHS): 0.79 K/UL (ref 0.3–1)
ABSOLUTE NEUTROPHIL COUNT (OHS): 6 K/UL (ref 1.8–7.7)
ANION GAP (OHS): 9 MMOL/L (ref 8–16)
BASOPHILS # BLD AUTO: 0.02 K/UL
BASOPHILS NFR BLD AUTO: 0.2 %
BUN SERPL-MCNC: 11 MG/DL (ref 6–20)
CALCIUM SERPL-MCNC: 9.1 MG/DL (ref 8.7–10.5)
CHLORIDE SERPL-SCNC: 111 MMOL/L (ref 95–110)
CO2 SERPL-SCNC: 20 MMOL/L (ref 23–29)
CREAT SERPL-MCNC: 1.2 MG/DL (ref 0.5–1.4)
ERYTHROCYTE [DISTWIDTH] IN BLOOD BY AUTOMATED COUNT: 13.3 % (ref 11.5–14.5)
GFR SERPLBLD CREATININE-BSD FMLA CKD-EPI: >60 ML/MIN/1.73/M2
GLUCOSE SERPL-MCNC: 98 MG/DL (ref 70–110)
HCT VFR BLD AUTO: 35.3 % (ref 40–54)
HGB BLD-MCNC: 11.8 GM/DL (ref 14–18)
IMM GRANULOCYTES # BLD AUTO: 0.33 K/UL (ref 0–0.04)
IMM GRANULOCYTES NFR BLD AUTO: 4.1 % (ref 0–0.5)
LYMPHOCYTES # BLD AUTO: 0.79 K/UL (ref 1–4.8)
MAGNESIUM SERPL-MCNC: 1.8 MG/DL (ref 1.6–2.6)
MCH RBC QN AUTO: 31.6 PG (ref 27–31)
MCHC RBC AUTO-ENTMCNC: 33.4 G/DL (ref 32–36)
MCV RBC AUTO: 94 FL (ref 82–98)
NUCLEATED RBC (/100WBC) (OHS): 0 /100 WBC
PHOSPHATE SERPL-MCNC: 3.2 MG/DL (ref 2.7–4.5)
PLATELET # BLD AUTO: 267 K/UL (ref 150–450)
PMV BLD AUTO: 10.6 FL (ref 9.2–12.9)
POTASSIUM SERPL-SCNC: 4.1 MMOL/L (ref 3.5–5.1)
RBC # BLD AUTO: 3.74 M/UL (ref 4.6–6.2)
RELATIVE EOSINOPHIL (OHS): 1.2 %
RELATIVE LYMPHOCYTE (OHS): 9.8 % (ref 18–48)
RELATIVE MONOCYTE (OHS): 9.8 % (ref 4–15)
RELATIVE NEUTROPHIL (OHS): 74.9 % (ref 38–73)
SODIUM SERPL-SCNC: 140 MMOL/L (ref 136–145)
WBC # BLD AUTO: 8.03 K/UL (ref 3.9–12.7)

## 2025-05-15 PROCEDURE — 84100 ASSAY OF PHOSPHORUS: CPT | Performed by: HOSPITALIST

## 2025-05-15 PROCEDURE — 36415 COLL VENOUS BLD VENIPUNCTURE: CPT | Performed by: HOSPITALIST

## 2025-05-15 PROCEDURE — 25000003 PHARM REV CODE 250: Performed by: PHYSICIAN ASSISTANT

## 2025-05-15 PROCEDURE — 85025 COMPLETE CBC W/AUTO DIFF WBC: CPT | Performed by: HOSPITALIST

## 2025-05-15 PROCEDURE — 83735 ASSAY OF MAGNESIUM: CPT | Performed by: HOSPITALIST

## 2025-05-15 PROCEDURE — 25000003 PHARM REV CODE 250: Performed by: HOSPITALIST

## 2025-05-15 PROCEDURE — 63600175 PHARM REV CODE 636 W HCPCS: Performed by: HOSPITALIST

## 2025-05-15 PROCEDURE — 80048 BASIC METABOLIC PNL TOTAL CA: CPT | Performed by: HOSPITALIST

## 2025-05-15 RX ORDER — ACETAMINOPHEN 500 MG
1000 TABLET ORAL EVERY 8 HOURS PRN
COMMUNITY
Start: 2025-05-15

## 2025-05-15 RX ORDER — COLCHICINE 0.6 MG/1
0.6 TABLET ORAL DAILY
Qty: 30 TABLET | Refills: 0 | Status: SHIPPED | OUTPATIENT
Start: 2025-05-15

## 2025-05-15 RX ORDER — LEVOFLOXACIN 750 MG/1
750 TABLET, FILM COATED ORAL DAILY
Status: DISCONTINUED | OUTPATIENT
Start: 2025-05-15 | End: 2025-05-15 | Stop reason: HOSPADM

## 2025-05-15 RX ORDER — LEVOFLOXACIN 750 MG/1
750 TABLET, FILM COATED ORAL DAILY
Qty: 10 TABLET | Refills: 0 | Status: SHIPPED | OUTPATIENT
Start: 2025-05-15 | End: 2025-05-25

## 2025-05-15 RX ADMIN — PANTOPRAZOLE SODIUM 40 MG: 40 TABLET, DELAYED RELEASE ORAL at 06:05

## 2025-05-15 RX ADMIN — BUTALBITAL, ACETAMINOPHEN, AND CAFFEINE 1 TABLET: 325; 50; 40 TABLET ORAL at 10:05

## 2025-05-15 RX ADMIN — LOSARTAN POTASSIUM 100 MG: 50 TABLET, FILM COATED ORAL at 10:05

## 2025-05-15 RX ADMIN — FLUOXETINE HYDROCHLORIDE 20 MG: 20 CAPSULE ORAL at 10:05

## 2025-05-15 RX ADMIN — PIPERACILLIN AND TAZOBACTAM 4.5 G: 4; .5 INJECTION, POWDER, LYOPHILIZED, FOR SOLUTION INTRAVENOUS at 02:05

## 2025-05-15 RX ADMIN — ALLOPURINOL 300 MG: 300 TABLET ORAL at 10:05

## 2025-05-15 RX ADMIN — SODIUM CHLORIDE: 9 INJECTION, SOLUTION INTRAVENOUS at 01:05

## 2025-05-15 RX ADMIN — LEVOFLOXACIN 750 MG: 750 TABLET, FILM COATED ORAL at 10:05

## 2025-05-15 RX ADMIN — COLCHICINE 0.6 MG: 0.6 TABLET, FILM COATED ORAL at 10:05

## 2025-05-15 NOTE — ASSESSMENT & PLAN NOTE
Mr. Dhruv Campos Jr. Is a 54 y.o. male, with PMH of HTN, HLD, paroxysmal A. Fib (not anticoagulated), gout, PUD, who presented to [facility] ED on 5/11/25 due to abdominal pain of several days duration. CT performed showed bilateral polycystic kidneys. Urology was consulted for evaluation of pyelonephritis.     -- Recommend broad spectrum antibiotics.   -- F/u urine cultures. Tailor to cultures and sensitivities  -- No obvious hydronephrosis, or obstructing stone.   -- PVR 0 cc's indicating adequate bladder emptying.  -- Will need outpatient hematuria workup given patient is intermediate risk based on age and smoking history   -- No indication for acute urologic intervention  -- Ok for discharge from urologic perspective. Will need 2 weeks of culture appropriate antibiotics  -- Will arrange outpatient follow up in 2 weeks   -- Rest of care per primary

## 2025-05-15 NOTE — SUBJECTIVE & OBJECTIVE
Interval History: AFVSS. NAEO. Denies fevers, chills, flank pain.       Objective:     Temp:  [98.1 °F (36.7 °C)-99.1 °F (37.3 °C)] 98.2 °F (36.8 °C)  Pulse:  [72-81] 73  Resp:  [14-16] 14  SpO2:  [94 %-99 %] 96 %  BP: (137-153)/(75-84) 153/82     Body mass index is 34.56 kg/m².      Bladder Scan Volume (mL): 0 mL (05/12/25 1252)    Drains       None                    Physical Exam  Constitutional:       General: He is not in acute distress.     Appearance: Normal appearance.   HENT:      Head: Normocephalic and atraumatic.   Eyes:      Extraocular Movements: Extraocular movements intact.      Pupils: Pupils are equal, round, and reactive to light.   Cardiovascular:      Rate and Rhythm: Normal rate.   Pulmonary:      Effort: Pulmonary effort is normal. No respiratory distress.   Abdominal:      General: Abdomen is flat. There is no distension.      Tenderness: There is no abdominal tenderness. There is no right CVA tenderness or left CVA tenderness.   Skin:     Coloration: Skin is not jaundiced.   Neurological:      General: No focal deficit present.      Mental Status: He is alert and oriented to person, place, and time.   Psychiatric:         Mood and Affect: Mood normal.         Behavior: Behavior normal.           Significant Labs:    BMP:  Recent Labs   Lab 05/13/25  0330 05/14/25  0351 05/15/25  0503    140 140   K 4.6 4.5 4.1    111* 111*   CO2 20* 19* 20*   BUN 20 17 11   CREATININE 1.4 1.3 1.2   CALCIUM 9.3 9.0 9.1       CBC:   Recent Labs   Lab 05/13/25  0330 05/14/25  0351 05/15/25  0503   WBC 10.89 7.95 8.03   HGB 12.3* 11.1* 11.8*   HCT 37.4* 33.5* 35.3*    195 267       All pertinent labs results from the past 24 hours have been reviewed.    Significant Imaging:  All pertinent imaging results/findings from the past 24 hours have been reviewed.

## 2025-05-15 NOTE — DISCHARGE SUMMARY
"[facility] Surg [address] Medicine  Discharge Summary      Patient Name: Dhruv Campos Jr.  MRN: [id number]  [facility]: [id number]  Patient Class: IP- Inpatient  Admission Date: 5/12/2025  Hospital Length of Stay: 3 days  Discharge Date and Time: 5/15/2025  1:55 PM  Attending Physician: Dhruv Ann MD  Discharging Provider: Dhruv Ann MD  Primary Care Provider: Mayelin Russell MD      HPI:   Manuelito Naik, PA-C:    "Mr. Dhruv Campos Jr. Is a 54 y.o. male, with PMH of HTN, HLD, paroxysmal A. Fib (not anticoagulated), gout, PUD, who presented to [facility] ED on 5/11/25 due to abdominal pain beginning sever days PTA. He states he saw his PCP, was diagnosed with a UTI, and started on Bactrim, and subsequently was called and advised by his PCP to discontinue the Bactrim as his liver enzymes were increasing. He notes associated fever, nausea, vomiting and chills. ED labs showed leukocytosis of 15K. BUN was 13 and Cr was 1.4, and [id number],  with normal Tbili of 0.6. A UA showed 2+ leukocytes, with >100 WBC/hpf and many bacteria. A CXR was without acute findings. A CT of the abdomen and pelvis showed numerous kidney lesions, and enhancement of the right kidney suggesting pyelonephritis. Case discussed with Urology, who agree to consult. He was transferred to [facility] [place]. He is admitted to inpatient status."      Hospital Course:   Patient is a 54 year man with history of hypertension, dyslipidemia, paroxysmal atrial fibrillation, and gout admitted with pyelonephritis and acute kidney injury.  Clinically improving with intravenous antibiotics. Evaluated by Urology and no intervention advised at this time.  Urine culture positive for >100,000 cfu/ml Escherichia coli sensitive to fluoroquinolone therapy.  Risks of fluoroquinolone therapy discussed.  Electrocardiogram performed not show elevation and QTc.  Patient tolerating oral intake well now.  Patient transitioned to " oral levofloxacin.  Hospital course also notable for mild gout flare treated with colchicine.  Recommended avoiding NSAID use due to mild acute kidney injury on presentation now resolved.  Patient discharged home in stable condition to complete a course of oral antibiotics with levofloxacin with outpatient follow up with Urology.  Close outpatient follow up with his primary care physician for ongoing management of medical comorbidities advised.  Patient advised to return to the hospital in the event of fever, chills, or flank pain or inability to tolerate his oral antibiotic therapy.     Goals of Care Treatment Preferences:  Code Status: Full Code      SDOH Screening:  The patient was screened for utility difficulties, food insecurity, transport difficulties, housing insecurity, and interpersonal safety and there were no concerns identified this admission.     Consults:   Consults (From admission, onward)          Status Ordering Provider     Inpatient consult to Urology  Once        Provider:  Addison Ponce DO Completed MARKER, JOHN D.            Final Active Diagnoses:    Diagnosis Date Noted POA    PRINCIPAL PROBLEM:  Pyelonephritis of right kidney [N12] 05/11/2025 Yes    Acute gout of left foot [M10.9] 05/14/2025 No    Elevated liver enzymes [R74.8] 05/12/2025 Yes    Essential hypertension [I10] 05/11/2025 Yes      Problems Resolved During this Admission:       Discharged Condition: Stable    Disposition: Home or Self Care    Follow Up:   Follow-up Information       Mayelin Russell MD. Go on 5/20/2025.    Specialty: Family Medicine  Why: 8:40 am, please bring this after visit summary to the appointment  Contact information:  [address]  [place] [id number]  [phone]                           Patient Instructions:      Diet Adult Regular     Notify your health care provider if you experience any of the following:  temperature >100.4     Notify your health care provider if you experience any of  the following:  persistent nausea and vomiting or diarrhea     Notify your health care provider if you experience any of the following:  severe uncontrolled pain     Notify your health care provider if you experience any of the following:  difficulty breathing or increased cough     Notify your health care provider if you experience any of the following:  severe persistent headache     Notify your health care provider if you experience any of the following:  worsening rash     Notify your health care provider if you experience any of the following:  persistent dizziness, light-headedness, or visual disturbances     Notify your health care provider if you experience any of the following:  increased confusion or weakness     Activity as tolerated        Medications:  Reconciled Home Medications:      Medication List        START taking these medications      acetaminophen 500 MG tablet  Commonly known as: TYLENOL  Take 2 tablets (1,000 mg total) by mouth every 8 (eight) hours as needed for Pain.     colchicine 0.6 mg tablet  Commonly known as: COLCRYS  Take 1 tablet (0.6 mg total) by mouth once daily.     levoFLOXacin 750 MG tablet  Commonly known as: LEVAQUIN  Take 1 tablet (750 mg total) by mouth once daily. for 10 days            CONTINUE taking these medications      allopurinoL 300 MG tablet  Commonly known as: ZYLOPRIM  Take 300 mg by mouth.     butalbital-acetaminophen-caffeine -40 mg -40 mg per tablet  Commonly known as: FIORICET, ESGIC  Take 1 tablet by mouth every 4 (four) hours as needed.     FLUoxetine 20 MG tablet  Take 20 mg by mouth every morning.     losartan 100 MG tablet  Commonly known as: COZAAR  Take 100 mg by mouth once daily.     lovastatin 40 MG tablet  Commonly known as: MEVACOR  Take 40 mg by mouth every evening.     pantoprazole 40 MG tablet  Commonly known as: PROTONIX  Take 40 mg by mouth every morning.            STOP taking these medications      gabapentin 300 MG  capsule  Commonly known as: NEURONTIN     HYDROcodone-acetaminophen 7.5-325 mg per tablet  Commonly known as: NORCO     meloxicam 15 MG tablet  Commonly known as: MOBIC     methylPREDNISolone 4 mg tablet  Commonly known as: MEDROL DOSEPACK     sulfamethoxazole-trimethoprim 800-160mg 800-160 mg Tab  Commonly known as: BACTRIM DS              Indwelling Lines/Drains at time of discharge:   Lines/Drains/Airways       None                   Time spent on the discharge of patient: 35 minutes         Dhruv Ann MD  Department of Hospital Medicine  [facility] - Med Surg ([address])

## 2025-05-15 NOTE — PROGRESS NOTES
[facility] Surg [address])  Urology  Progress Note    Patient Name: Dhruv Campos Jr.  MRN: [id number]  Admission Date: 5/12/2025  Hospital Length of Stay: 3 days  Code Status: Full Code   Attending Provider: Dhruv Ann MD   Primary Care Physician: Mayelin Russell MD    Subjective:     HPI:  Mr. Dhruv Campos Jr. Is a 54 y.o. male, with PMH of HTN, HLD, paroxysmal A. Fib (not anticoagulated), gout, PUD, who presented to [facility] ED on 5/11/25 due to abdominal pain of several days duration. CT performed showed bilateral polycystic kidneys. Urology was consulted for evaluation of pyelonephritis.     On assessment the patient is afebrile, tachycardic to 120, otherwise vitally stable. He states he saw his PCP, was diagnosed with a UTI, and started on Bactrim, which was discontinued due to elevated liver enzymes. He complains of  fever, nausea, vomiting and chills. Denies hematuria, flank pain. 30 pack year history.     WBC 14.05 from 15.03 on admission. Hg 13.9. Cr 1.5 (1.3 in 2021). UA nitrite negative, 15 RBC, >100 WBC and many bacteria.     CT with bilateral polycystic kidneys, no obvious hydronephrosis, no obstructing stones.             Interval History: AFVSS. NAEO. Denies fevers, chills, flank pain.       Objective:     Temp:  [98.1 °F (36.7 °C)-99.1 °F (37.3 °C)] 98.2 °F (36.8 °C)  Pulse:  [72-81] 73  Resp:  [14-16] 14  SpO2:  [94 %-99 %] 96 %  BP: (137-153)/(75-84) 153/82     Body mass index is 34.56 kg/m².      Bladder Scan Volume (mL): 0 mL (05/12/25 1252)    Drains       None                    Physical Exam  Constitutional:       General: He is not in acute distress.     Appearance: Normal appearance.   HENT:      Head: Normocephalic and atraumatic.   Eyes:      Extraocular Movements: Extraocular movements intact.      Pupils: Pupils are equal, round, and reactive to light.   Cardiovascular:      Rate and Rhythm: Normal rate.   Pulmonary:      Effort: Pulmonary effort is  normal. No respiratory distress.   Abdominal:      General: Abdomen is flat. There is no distension.      Tenderness: There is no abdominal tenderness. There is no right CVA tenderness or left CVA tenderness.   Skin:     Coloration: Skin is not jaundiced.   Neurological:      General: No focal deficit present.      Mental Status: He is alert and oriented to person, place, and time.   Psychiatric:         Mood and Affect: Mood normal.         Behavior: Behavior normal.           Significant Labs:    BMP:  Recent Labs   Lab 05/13/25  0330 05/14/25  0351 05/15/25  0503    140 140   K 4.6 4.5 4.1    111* 111*   CO2 20* 19* 20*   BUN 20 17 11   CREATININE 1.4 1.3 1.2   CALCIUM 9.3 9.0 9.1       CBC:   Recent Labs   Lab 05/13/25 0330 05/14/25  0351 05/15/25  0503   WBC 10.89 7.95 8.03   HGB 12.3* 11.1* 11.8*   HCT 37.4* 33.5* 35.3*    195 267       All pertinent labs results from the past 24 hours have been reviewed.    Significant Imaging:  All pertinent imaging results/findings from the past 24 hours have been reviewed.                  Assessment/Plan:     * Pyelonephritis of right kidney  Mr. Dhruv Campos Amira Is a 54 y.o. male, with PMH of HTN, HLD, paroxysmal A. Fib (not anticoagulated), gout, PUD, who presented to [facility] ED on 5/11/25 due to abdominal pain of several days duration. CT performed showed bilateral polycystic kidneys. Urology was consulted for evaluation of pyelonephritis.     -- Recommend broad spectrum antibiotics.   -- F/u urine cultures. Tailor to cultures and sensitivities  -- No obvious hydronephrosis, or obstructing stone.   -- PVR 0 cc's indicating adequate bladder emptying.  -- Will need outpatient hematuria workup given patient is intermediate risk based on age and smoking history   -- No indication for acute urologic intervention  -- Ok for discharge from urologic perspective. Will need 2 weeks of culture appropriate antibiotics  -- Will arrange outpatient  follow up in 2 weeks   -- Rest of care per primary         VTE Risk Mitigation (From admission, onward)           Ordered     enoxaparin injection 40 mg  Daily         05/14/25 0834     IP VTE HIGH RISK PATIENT  Once         05/14/25 0834     Place sequential compression device  Until discontinued         05/12/25 0056                    Addison Ponce DO  Urology  [facility] - Med Surg ([address])

## 2025-05-15 NOTE — PLAN OF CARE
Case Management Final Discharge Note    Discharge Disposition: home with family    New DME ordered / company name: none    Relevant SDOH / Transition of Care Barriers:  none    Person available to provide assistance at home when needed and their contact information: spouse     Scheduled followup appointment: PCP and Urology     Referrals placed: none    Transportation: spouse will transport patient home         Patient educated on discharge services and updated on DC plan. Bedside RN notified, patient clear to discharge from Case Management Perspective.     05/15/25 1245   Final Note   Assessment Type Final Discharge Note   Anticipated Discharge Disposition Home   What phone number can be called within the next 1-3 days to see how you are doing after discharge?   ([phone])   Hospital Resources/Appts/Education Provided Provided patient/caregiver with written discharge plan information;Appointments scheduled and added to AVS   Post-Acute Status   Discharge Delays None known at this time     [facility] - Med Surg ([address])  Discharge Final Note    Primary Care Provider: Mayelin Russell MD    Expected Discharge Date: 5/15/2025    Final Discharge Note (most recent)       Final Note - 05/15/25 1245          Final Note    Assessment Type Final Discharge Note     Anticipated Discharge Disposition Home or Self Care     What phone number can be called within the next 1-3 days to see how you are doing after discharge? --   [phone]    Hospital Resources/Appts/Education Provided Provided patient/caregiver with written discharge plan information;Appointments scheduled and added to AVS        Post-Acute Status    Discharge Delays None known at this time                     Important Message from Medicare             Contact Info       Mayelin Russell MD   Specialty: Family Medicine   Relationship: PCP - General    [address] [place] [address] [place] [id number]   Phone: [phone]       Next Steps: Go on 5/20/2025     Instructions: 8:40 am, please bring this after visit summary to the appointment

## 2025-06-05 ENCOUNTER — TELEPHONE (OUTPATIENT)
Dept: UROLOGY | Facility: CLINIC | Age: 55
End: 2025-06-05
Payer: COMMERCIAL

## 2025-06-05 ENCOUNTER — OFFICE VISIT (OUTPATIENT)
Dept: UROLOGY | Facility: CLINIC | Age: 55
End: 2025-06-05
Payer: COMMERCIAL

## 2025-06-05 VITALS
HEART RATE: 91 BPM | SYSTOLIC BLOOD PRESSURE: 123 MMHG | DIASTOLIC BLOOD PRESSURE: 71 MMHG | WEIGHT: 207.69 LBS | RESPIRATION RATE: 18 BRPM | BODY MASS INDEX: 34.6 KG/M2 | HEIGHT: 65 IN

## 2025-06-05 DIAGNOSIS — N12 PYELONEPHRITIS OF RIGHT KIDNEY: ICD-10-CM

## 2025-06-05 DIAGNOSIS — N28.9 RENAL LESION: Primary | ICD-10-CM

## 2025-06-05 DIAGNOSIS — Q61.3 POLYCYSTIC KIDNEY DISEASE: ICD-10-CM

## 2025-06-05 LAB
BILIRUB SERPL-MCNC: NORMAL MG/DL
BLOOD URINE, POC: NORMAL
CLARITY, POC UA: CLEAR
COLOR, POC UA: YELLOW
GLUCOSE UR QL STRIP: NORMAL
KETONES UR QL STRIP: NORMAL
LEUKOCYTE ESTERASE URINE, POC: NORMAL
NITRITE, POC UA: NORMAL
PH, POC UA: 6
PROTEIN, POC: NORMAL
SPECIFIC GRAVITY, POC UA: 1.01
UROBILINOGEN, POC UA: NORMAL

## 2025-06-05 PROCEDURE — 1159F MED LIST DOCD IN RCRD: CPT | Mod: CPTII,S$GLB,, | Performed by: NURSE PRACTITIONER

## 2025-06-05 PROCEDURE — 99214 OFFICE O/P EST MOD 30 MIN: CPT | Mod: S$GLB,,, | Performed by: NURSE PRACTITIONER

## 2025-06-05 PROCEDURE — 4010F ACE/ARB THERAPY RXD/TAKEN: CPT | Mod: CPTII,S$GLB,, | Performed by: NURSE PRACTITIONER

## 2025-06-05 PROCEDURE — 3078F DIAST BP <80 MM HG: CPT | Mod: CPTII,S$GLB,, | Performed by: NURSE PRACTITIONER

## 2025-06-05 PROCEDURE — 3008F BODY MASS INDEX DOCD: CPT | Mod: CPTII,S$GLB,, | Performed by: NURSE PRACTITIONER

## 2025-06-05 PROCEDURE — 1160F RVW MEDS BY RX/DR IN RCRD: CPT | Mod: CPTII,S$GLB,, | Performed by: NURSE PRACTITIONER

## 2025-06-05 PROCEDURE — 1111F DSCHRG MED/CURRENT MED MERGE: CPT | Mod: CPTII,S$GLB,, | Performed by: NURSE PRACTITIONER

## 2025-06-05 PROCEDURE — 81002 URINALYSIS NONAUTO W/O SCOPE: CPT | Mod: S$GLB,,, | Performed by: NURSE PRACTITIONER

## 2025-06-05 PROCEDURE — 3074F SYST BP LT 130 MM HG: CPT | Mod: CPTII,S$GLB,, | Performed by: NURSE PRACTITIONER

## 2025-06-06 ENCOUNTER — HOSPITAL ENCOUNTER (OUTPATIENT)
Dept: RADIOLOGY | Facility: HOSPITAL | Age: 55
Discharge: HOME | End: 2025-06-06
Attending: NURSE PRACTITIONER
Payer: COMMERCIAL

## 2025-06-06 ENCOUNTER — TELEPHONE (OUTPATIENT)
Dept: UROLOGY | Facility: CLINIC | Age: 55
End: 2025-06-06
Payer: COMMERCIAL

## 2025-06-06 DIAGNOSIS — N28.9 RENAL LESION: ICD-10-CM

## 2025-06-06 PROCEDURE — 74178 CT ABD&PLV WO CNTR FLWD CNTR: CPT | Mod: 26,,, | Performed by: RADIOLOGY

## 2025-06-06 PROCEDURE — 74178 CT ABD&PLV WO CNTR FLWD CNTR: CPT | Mod: TC

## 2025-06-06 PROCEDURE — 25500020 PHARM REV CODE 255: Performed by: NURSE PRACTITIONER

## 2025-06-06 RX ADMIN — IOHEXOL 100 ML: 350 INJECTION, SOLUTION INTRAVENOUS at 01:06

## 2025-06-09 ENCOUNTER — RESULTS FOLLOW-UP (OUTPATIENT)
Dept: UROLOGY | Facility: CLINIC | Age: 55
End: 2025-06-09

## 2025-06-09 DIAGNOSIS — Q61.3 POLYCYSTIC KIDNEY: Primary | ICD-10-CM

## 2025-06-11 ENCOUNTER — PROCEDURE VISIT (OUTPATIENT)
Dept: UROLOGY | Facility: CLINIC | Age: 55
End: 2025-06-11
Attending: UROLOGY
Payer: COMMERCIAL

## 2025-06-11 VITALS
SYSTOLIC BLOOD PRESSURE: 128 MMHG | BODY MASS INDEX: 34.44 KG/M2 | OXYGEN SATURATION: 97 % | WEIGHT: 206.69 LBS | DIASTOLIC BLOOD PRESSURE: 72 MMHG | HEART RATE: 71 BPM | HEIGHT: 65 IN | TEMPERATURE: 98 F

## 2025-06-11 DIAGNOSIS — N12 PYELONEPHRITIS OF RIGHT KIDNEY: Primary | ICD-10-CM

## 2025-06-11 DIAGNOSIS — N28.9 RENAL LESION: ICD-10-CM

## 2025-06-11 DIAGNOSIS — Q54.0 BALANIC HYPOSPADIAS: ICD-10-CM

## 2025-06-11 DIAGNOSIS — Q61.3 POLYCYSTIC KIDNEY DISEASE: ICD-10-CM

## 2025-06-11 LAB
BILIRUB SERPL-MCNC: NORMAL MG/DL
BLOOD URINE, POC: NORMAL
CLARITY, POC UA: CLEAR
COLOR, POC UA: YELLOW
GLUCOSE UR QL STRIP: NORMAL
KETONES UR QL STRIP: NORMAL
LEUKOCYTE ESTERASE URINE, POC: NORMAL
NITRITE, POC UA: NORMAL
PH, POC UA: 5
PROTEIN, POC: NORMAL
SPECIFIC GRAVITY, POC UA: 1.01
UROBILINOGEN, POC UA: NORMAL

## 2025-06-11 PROCEDURE — 99214 OFFICE O/P EST MOD 30 MIN: CPT | Mod: S$GLB,,, | Performed by: UROLOGY

## 2025-06-11 PROCEDURE — 81002 URINALYSIS NONAUTO W/O SCOPE: CPT | Mod: S$GLB,,, | Performed by: UROLOGY

## 2025-06-11 RX ORDER — CIPROFLOXACIN 500 MG/1
500 TABLET, FILM COATED ORAL
Status: COMPLETED | OUTPATIENT
Start: 2025-06-11 | End: 2025-06-11

## 2025-06-11 RX ORDER — LIDOCAINE HYDROCHLORIDE 20 MG/ML
JELLY TOPICAL
Status: COMPLETED | OUTPATIENT
Start: 2025-06-11 | End: 2025-06-11

## 2025-06-11 RX ADMIN — LIDOCAINE HYDROCHLORIDE: 20 JELLY TOPICAL at 03:06

## 2025-06-11 RX ADMIN — CIPROFLOXACIN 500 MG: 500 TABLET, FILM COATED ORAL at 03:06

## 2025-06-11 NOTE — PROCEDURES
Cystoscopy    Date/Time: 6/11/2025 3:00 PM    Performed by: Sanna Roque MD  Authorized by: Alma Godoy NP    Consent Done?:  Yes (Written)  Timeout: prior to procedure the correct patient, procedure, and site was verified    Prep: patient was prepped and draped in usual sterile fashion    Anesthesia:  Lidocaine jelly  Indications comment:  UTI  Position:  Supine  Anesthesia:  Lidocaine jelly  Patient sedated?: No    Preparation: Patient was prepped and draped in usual sterile fashion    Scope type:  Flexible cystoscope  Stent inserted: No    Stent removed: No    External exam normal: No    Hypospadius: Yes (blind ending urethral meatus, small approx 6Fr true meatus noted just ventral to urethral meatus)    Digital exam performed: No     patient tolerated the procedure well with no immediate complications  Comments:        CT c/w PCKD. Planned to see nephrology soon.    Cystoscopy unable to be completed 2/2 narrow hypospadiac urethral meatus.    UA clear today

## 2025-06-11 NOTE — PROCEDURES
"Subjective:       Dhruv Campos Jr. is a 54 y.o. male who is an established patient who was seen  for evaluation of UTI.      Seen as inpatient consult for UTI/pyelonephritis with PCKD. Planned for cystoscopy today but unable to be completed 2/2 hypospadias/narrow meatus.    Repeat CT 6/25 - PCKD without solid mass/stone    Urine has cleared. He denies significant LUTS, specifically no urine spraying.     The following portions of the patient's history were reviewed and updated as appropriate: allergies, current medications, past family history, past medical history, past social history, past surgical history and problem list.    Review of Systems  Review of systems completed. Pertinent positive and negatives listed in HPI.      Objective:    Vitals: /72 (BP Location: Left arm, Patient Position: Sitting)   Pulse 71   Temp 97.8 °F (36.6 °C) (Temporal)   Ht 5' 5" (1.651 m)   Wt 93.8 kg (206 lb 10.9 oz)   SpO2 97%   BMI 34.39 kg/m²     Physical Exam   General: well developed, well nourished in no acute distress  Head: normocephalic, atraumatic  Neck: no obvious enlargement of thyroid  HEENT: EOMI, mucus membranes moist, sclera anicteric, no hearing impairment  Lungs: symmetric expansion, non-labored breathing  Neuro: alert and oriented x 3, no gross deficits  Psych: normal judgment and insight, normal mood/affect and non-anxious  Genitourinary:   Urethra - blind ending distal meatus with small 6Fr hypospadias urethral meatus just proximal to blind ending.   CHRIS: deferred      Lab Review   Urine analysis today in clinic shows Negative     Lab Results   Component Value Date    WBC 8.03 05/15/2025    HGB 11.8 (L) 05/15/2025    HGB 15.9 12/02/2021    HCT 35.3 (L) 05/15/2025    HCT 46.4 12/02/2021    MCV 94 05/15/2025    MCV 94 12/02/2021     05/15/2025     12/02/2021     Lab Results   Component Value Date    CREATININE 1.2 05/15/2025    BUN 11 05/15/2025     No results found for: "PSA"  No " "results found for: "PSADIAG"    Imaging  CT reviewed  Reports and images were personally reviewed by me and discussed with the patient today         Assessment/Plan:      1. Pyelonephritis of right kidney    - Resolved   - UA clear today   - Planned for cystoscopy today but unable to pass cystoscope due to hypospadias/narrow meatus.   - Discussed possible OR for dilation/meatoplasty for cystoscopy. Risk of worsening stenosis.    - Will monitor UA - if recurrent UTI or hematuria, will proceed with cysto in OR.      2. Renal lesion    - PCKD, no solid mass     3. Polycystic kidney disease     - Planned to see nephrology soon      4. Balanic hypospadias    - Noted on attempted cystoscopy today   - Functionally doing well, no need for intervention   - Consider dilation vs meatoplasty if cystoscopy needed or becomes symptomatic in other ways.    - Discussed congenital nature of hypospadias       Follow up in 3 months for UA         "

## 2025-08-05 ENCOUNTER — TELEPHONE (OUTPATIENT)
Dept: NEPHROLOGY | Facility: CLINIC | Age: 55
End: 2025-08-05
Payer: COMMERCIAL

## 2025-08-05 DIAGNOSIS — N17.9 AKI (ACUTE KIDNEY INJURY): Primary | ICD-10-CM

## 2025-08-06 ENCOUNTER — LAB VISIT (OUTPATIENT)
Dept: LAB | Facility: HOSPITAL | Age: 55
End: 2025-08-06
Attending: SPECIALIST
Payer: COMMERCIAL

## 2025-08-06 DIAGNOSIS — N17.9 AKI (ACUTE KIDNEY INJURY): ICD-10-CM

## 2025-08-06 LAB
ABSOLUTE EOSINOPHIL (OHS): 0.1 K/UL
ABSOLUTE MONOCYTE (OHS): 0.61 K/UL (ref 0.3–1)
ABSOLUTE NEUTROPHIL COUNT (OHS): 4.01 K/UL (ref 1.8–7.7)
ALBUMIN SERPL BCP-MCNC: 4.4 G/DL (ref 3.5–5.2)
ALBUMIN/CREAT UR: 16.4 UG/MG
ANION GAP (OHS): 11 MMOL/L (ref 8–16)
BASOPHILS # BLD AUTO: 0.02 K/UL
BASOPHILS NFR BLD AUTO: 0.3 %
BILIRUB UR QL STRIP.AUTO: NEGATIVE
BUN SERPL-MCNC: 20 MG/DL (ref 6–20)
CALCIUM SERPL-MCNC: 9.7 MG/DL (ref 8.7–10.5)
CHLORIDE SERPL-SCNC: 105 MMOL/L (ref 95–110)
CLARITY UR: CLEAR
CO2 SERPL-SCNC: 26 MMOL/L (ref 23–29)
COLOR UR AUTO: YELLOW
CREAT SERPL-MCNC: 1.6 MG/DL (ref 0.5–1.4)
CREAT UR-MCNC: 109.8 MG/DL (ref 23–375)
ERYTHROCYTE [DISTWIDTH] IN BLOOD BY AUTOMATED COUNT: 13.2 % (ref 11.5–14.5)
FERRITIN SERPL-MCNC: 63 NG/ML (ref 20–300)
GFR SERPLBLD CREATININE-BSD FMLA CKD-EPI: 51 ML/MIN/1.73/M2
GLUCOSE SERPL-MCNC: 88 MG/DL (ref 70–110)
GLUCOSE UR QL STRIP: NEGATIVE
HCT VFR BLD AUTO: 45.1 % (ref 40–54)
HGB BLD-MCNC: 15.5 GM/DL (ref 14–18)
HGB UR QL STRIP: NEGATIVE
IMM GRANULOCYTES # BLD AUTO: 0.03 K/UL (ref 0–0.04)
IMM GRANULOCYTES NFR BLD AUTO: 0.5 % (ref 0–0.5)
IRON SATN MFR SERPL: 38 % (ref 20–50)
IRON SERPL-MCNC: 128 UG/DL (ref 45–160)
KETONES UR QL STRIP: NEGATIVE
LEUKOCYTE ESTERASE UR QL STRIP: NEGATIVE
LYMPHOCYTES # BLD AUTO: 1.27 K/UL (ref 1–4.8)
MAGNESIUM SERPL-MCNC: 1.8 MG/DL (ref 1.6–2.6)
MCH RBC QN AUTO: 31.6 PG (ref 27–31)
MCHC RBC AUTO-ENTMCNC: 34.4 G/DL (ref 32–36)
MCV RBC AUTO: 92 FL (ref 82–98)
MICROALBUMIN UR-MCNC: 18 UG/ML (ref ?–5000)
NITRITE UR QL STRIP: NEGATIVE
NUCLEATED RBC (/100WBC) (OHS): 0 /100 WBC
PH UR STRIP: 6 [PH]
PHOSPHATE SERPL-MCNC: 4 MG/DL (ref 2.7–4.5)
PLATELET # BLD AUTO: 228 K/UL (ref 150–450)
PMV BLD AUTO: 10.2 FL (ref 9.2–12.9)
POTASSIUM SERPL-SCNC: 4.2 MMOL/L (ref 3.5–5.1)
PROT UR QL STRIP: NEGATIVE
PTH-INTACT SERPL-MCNC: 57.5 PG/ML (ref 9–77)
RBC # BLD AUTO: 4.91 M/UL (ref 4.6–6.2)
RELATIVE EOSINOPHIL (OHS): 1.7 %
RELATIVE LYMPHOCYTE (OHS): 21 % (ref 18–48)
RELATIVE MONOCYTE (OHS): 10.1 % (ref 4–15)
RELATIVE NEUTROPHIL (OHS): 66.4 % (ref 38–73)
SODIUM SERPL-SCNC: 142 MMOL/L (ref 136–145)
SP GR UR STRIP: 1.01
TIBC SERPL-MCNC: 336 UG/DL (ref 250–450)
TRANSFERRIN SERPL-MCNC: 227 MG/DL (ref 200–375)
UROBILINOGEN UR STRIP-ACNC: NEGATIVE EU/DL
WBC # BLD AUTO: 6.04 K/UL (ref 3.9–12.7)

## 2025-08-06 PROCEDURE — 36415 COLL VENOUS BLD VENIPUNCTURE: CPT

## 2025-08-06 PROCEDURE — 82043 UR ALBUMIN QUANTITATIVE: CPT

## 2025-08-06 PROCEDURE — 83540 ASSAY OF IRON: CPT

## 2025-08-06 PROCEDURE — 82728 ASSAY OF FERRITIN: CPT

## 2025-08-06 PROCEDURE — 81003 URINALYSIS AUTO W/O SCOPE: CPT

## 2025-08-06 PROCEDURE — 85025 COMPLETE CBC W/AUTO DIFF WBC: CPT

## 2025-08-06 PROCEDURE — 83735 ASSAY OF MAGNESIUM: CPT

## 2025-08-06 PROCEDURE — 82306 VITAMIN D 25 HYDROXY: CPT

## 2025-08-06 PROCEDURE — 83970 ASSAY OF PARATHORMONE: CPT

## 2025-08-06 PROCEDURE — 82374 ASSAY BLOOD CARBON DIOXIDE: CPT

## 2025-08-07 LAB — 25(OH)D3+25(OH)D2 SERPL-MCNC: 52 NG/ML (ref 30–96)

## 2025-08-08 ENCOUNTER — OFFICE VISIT (OUTPATIENT)
Dept: NEPHROLOGY | Facility: CLINIC | Age: 55
End: 2025-08-08
Payer: COMMERCIAL

## 2025-08-08 VITALS
WEIGHT: 212.31 LBS | SYSTOLIC BLOOD PRESSURE: 132 MMHG | HEIGHT: 65 IN | BODY MASS INDEX: 35.37 KG/M2 | OXYGEN SATURATION: 95 % | DIASTOLIC BLOOD PRESSURE: 82 MMHG | HEART RATE: 82 BPM

## 2025-08-08 DIAGNOSIS — E83.9 CHRONIC KIDNEY DISEASE-MINERAL BONE DISORDER (CKD-MBD) WITH STAGE 3A CHRONIC KIDNEY DISEASE: ICD-10-CM

## 2025-08-08 DIAGNOSIS — N18.31 CKD STAGE 3A, GFR 45-59 ML/MIN: Primary | ICD-10-CM

## 2025-08-08 DIAGNOSIS — Q61.3 PKD (POLYCYSTIC KIDNEY DISEASE): ICD-10-CM

## 2025-08-08 DIAGNOSIS — N18.31 CHRONIC KIDNEY DISEASE-MINERAL BONE DISORDER (CKD-MBD) WITH STAGE 3A CHRONIC KIDNEY DISEASE: ICD-10-CM

## 2025-08-08 DIAGNOSIS — M89.9 CHRONIC KIDNEY DISEASE-MINERAL BONE DISORDER (CKD-MBD) WITH STAGE 3A CHRONIC KIDNEY DISEASE: ICD-10-CM

## 2025-08-08 DIAGNOSIS — I10 ESSENTIAL HYPERTENSION: ICD-10-CM

## 2025-08-08 PROCEDURE — 3075F SYST BP GE 130 - 139MM HG: CPT | Mod: CPTII,S$GLB,,

## 2025-08-08 PROCEDURE — 1159F MED LIST DOCD IN RCRD: CPT | Mod: CPTII,S$GLB,,

## 2025-08-08 PROCEDURE — 3079F DIAST BP 80-89 MM HG: CPT | Mod: CPTII,S$GLB,,

## 2025-08-08 PROCEDURE — 3066F NEPHROPATHY DOC TX: CPT | Mod: CPTII,S$GLB,,

## 2025-08-08 PROCEDURE — 99999 PR PBB SHADOW E&M-EST. PATIENT-LVL IV: CPT | Mod: PBBFAC,,,

## 2025-08-08 PROCEDURE — 3008F BODY MASS INDEX DOCD: CPT | Mod: CPTII,S$GLB,,

## 2025-08-08 PROCEDURE — 99204 OFFICE O/P NEW MOD 45 MIN: CPT | Mod: S$GLB,,,

## 2025-08-08 PROCEDURE — 3061F NEG MICROALBUMINURIA REV: CPT | Mod: CPTII,S$GLB,,

## 2025-08-08 PROCEDURE — G2211 COMPLEX E/M VISIT ADD ON: HCPCS | Mod: S$GLB,,,

## 2025-08-08 PROCEDURE — 4010F ACE/ARB THERAPY RXD/TAKEN: CPT | Mod: CPTII,S$GLB,,

## (undated) DEVICE — SPONGE SURGIFOAM 100 8.5X12X10

## (undated) DEVICE — DISSECTOR LIGASURE EXACT 21CM

## (undated) DEVICE — SEE MEDLINE ITEM 157148

## (undated) DEVICE — TAPE SILK 3IN

## (undated) DEVICE — LUBRICANT SURGILUBE 2 OZ

## (undated) DEVICE — TIP YANKAUERS BULB NO VENT

## (undated) DEVICE — PANTIES FEMININE NAPKIN LG/XLG

## (undated) DEVICE — GAUZE SPONGE 4X4 12PLY

## (undated) DEVICE — BRIEF MESH LARGE

## (undated) DEVICE — TRAY MINOR GEN SURG

## (undated) DEVICE — ELECTRODE REM PLYHSV RETURN 9

## (undated) DEVICE — BOVIE SUCTION

## (undated) DEVICE — SEE MEDLINE ITEM 154981